# Patient Record
Sex: FEMALE | Race: BLACK OR AFRICAN AMERICAN | Employment: UNEMPLOYED | URBAN - METROPOLITAN AREA
[De-identification: names, ages, dates, MRNs, and addresses within clinical notes are randomized per-mention and may not be internally consistent; named-entity substitution may affect disease eponyms.]

---

## 2017-01-05 ENCOUNTER — APPOINTMENT (OUTPATIENT)
Dept: GENERAL RADIOLOGY | Age: 62
End: 2017-01-05
Attending: EMERGENCY MEDICINE
Payer: MEDICARE

## 2017-01-05 ENCOUNTER — HOSPITAL ENCOUNTER (EMERGENCY)
Age: 62
Discharge: HOME | End: 2017-01-05
Attending: EMERGENCY MEDICINE
Payer: MEDICARE

## 2017-01-05 VITALS
DIASTOLIC BLOOD PRESSURE: 63 MMHG | HEIGHT: 62 IN | OXYGEN SATURATION: 96 % | RESPIRATION RATE: 16 BRPM | SYSTOLIC BLOOD PRESSURE: 133 MMHG | TEMPERATURE: 98.1 F | WEIGHT: 120 LBS | HEART RATE: 54 BPM | BODY MASS INDEX: 22.08 KG/M2
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
## 2017-01-05 DIAGNOSIS — S70.01XA CONTUSION OF RIGHT HIP, INITIAL ENCOUNTER: Primary | ICD-10-CM

## 2017-01-05 DIAGNOSIS — R73.9 HYPERGLYCEMIA: ICD-10-CM

## 2017-01-05 LAB
ALBUMIN SERPL BCP-MCNC: 3.3 G/DL (ref 3.2–4.6)
ALBUMIN/GLOB SERPL: 0.8 {RATIO} (ref 1.2–3.5)
ALP SERPL-CCNC: 298 U/L (ref 50–136)
ALT SERPL-CCNC: 10 U/L (ref 12–65)
ANION GAP BLD CALC-SCNC: 11 MMOL/L (ref 7–16)
AST SERPL W P-5'-P-CCNC: 13 U/L (ref 15–37)
BASOPHILS # BLD AUTO: 0 K/UL (ref 0–0.2)
BASOPHILS # BLD: 0 % (ref 0–2)
BILIRUB SERPL-MCNC: 0.9 MG/DL (ref 0.2–1.1)
BNP SERPL-MCNC: 3077 PG/ML
BUN SERPL-MCNC: 55 MG/DL (ref 8–23)
CALCIUM SERPL-MCNC: 8.7 MG/DL (ref 8.3–10.4)
CHLORIDE SERPL-SCNC: 93 MMOL/L (ref 98–107)
CO2 SERPL-SCNC: 29 MMOL/L (ref 21–32)
CREAT SERPL-MCNC: 8.82 MG/DL (ref 0.6–1)
DIFFERENTIAL METHOD BLD: ABNORMAL
EOSINOPHIL # BLD: 0 K/UL (ref 0–0.8)
EOSINOPHIL NFR BLD: 1 % (ref 0.5–7.8)
ERYTHROCYTE [DISTWIDTH] IN BLOOD BY AUTOMATED COUNT: 14.4 % (ref 11.9–14.6)
GLOBULIN SER CALC-MCNC: 3.9 G/DL (ref 2.3–3.5)
GLUCOSE BLD STRIP.AUTO-MCNC: 239 MG/DL (ref 65–100)
GLUCOSE BLD STRIP.AUTO-MCNC: 382 MG/DL (ref 65–100)
GLUCOSE BLD STRIP.AUTO-MCNC: 414 MG/DL (ref 65–100)
GLUCOSE BLD STRIP.AUTO-MCNC: 442 MG/DL (ref 65–100)
GLUCOSE SERPL-MCNC: 490 MG/DL (ref 65–100)
HCT VFR BLD AUTO: 36.3 % (ref 35.8–46.3)
HGB BLD-MCNC: 11.5 G/DL (ref 11.7–15.4)
IMM GRANULOCYTES # BLD: 0 K/UL (ref 0–0.5)
IMM GRANULOCYTES NFR BLD AUTO: 0.2 % (ref 0–5)
LYMPHOCYTES # BLD AUTO: 24 % (ref 13–44)
LYMPHOCYTES # BLD: 1.2 K/UL (ref 0.5–4.6)
MCH RBC QN AUTO: 30.5 PG (ref 26.1–32.9)
MCHC RBC AUTO-ENTMCNC: 31.7 G/DL (ref 31.4–35)
MCV RBC AUTO: 96.3 FL (ref 79.6–97.8)
MONOCYTES # BLD: 0.3 K/UL (ref 0.1–1.3)
MONOCYTES NFR BLD AUTO: 6 % (ref 4–12)
NEUTS SEG # BLD: 3.4 K/UL (ref 1.7–8.2)
NEUTS SEG NFR BLD AUTO: 69 % (ref 43–78)
PLATELET # BLD AUTO: 162 K/UL (ref 150–450)
PMV BLD AUTO: 12.5 FL (ref 10.8–14.1)
POTASSIUM SERPL-SCNC: 4.7 MMOL/L (ref 3.5–5.1)
PROT SERPL-MCNC: 7.2 G/DL (ref 6.3–8.2)
RBC # BLD AUTO: 3.77 M/UL (ref 4.05–5.25)
SODIUM SERPL-SCNC: 133 MMOL/L (ref 136–145)
WBC # BLD AUTO: 5 K/UL (ref 4.3–11.1)

## 2017-01-05 PROCEDURE — 82962 GLUCOSE BLOOD TEST: CPT

## 2017-01-05 PROCEDURE — 74011250636 HC RX REV CODE- 250/636: Performed by: EMERGENCY MEDICINE

## 2017-01-05 PROCEDURE — 99285 EMERGENCY DEPT VISIT HI MDM: CPT | Performed by: EMERGENCY MEDICINE

## 2017-01-05 PROCEDURE — 83880 ASSAY OF NATRIURETIC PEPTIDE: CPT

## 2017-01-05 PROCEDURE — 96374 THER/PROPH/DIAG INJ IV PUSH: CPT | Performed by: EMERGENCY MEDICINE

## 2017-01-05 PROCEDURE — 96376 TX/PRO/DX INJ SAME DRUG ADON: CPT | Performed by: EMERGENCY MEDICINE

## 2017-01-05 PROCEDURE — 96375 TX/PRO/DX INJ NEW DRUG ADDON: CPT | Performed by: EMERGENCY MEDICINE

## 2017-01-05 PROCEDURE — 85025 COMPLETE CBC W/AUTO DIFF WBC: CPT | Performed by: EMERGENCY MEDICINE

## 2017-01-05 PROCEDURE — 80053 COMPREHEN METABOLIC PANEL: CPT | Performed by: EMERGENCY MEDICINE

## 2017-01-05 PROCEDURE — 71010 XR CHEST SNGL V: CPT

## 2017-01-05 PROCEDURE — 74011250637 HC RX REV CODE- 250/637: Performed by: EMERGENCY MEDICINE

## 2017-01-05 PROCEDURE — 73502 X-RAY EXAM HIP UNI 2-3 VIEWS: CPT

## 2017-01-05 PROCEDURE — 74011636637 HC RX REV CODE- 636/637: Performed by: EMERGENCY MEDICINE

## 2017-01-05 RX ORDER — HYDROCODONE BITARTRATE AND ACETAMINOPHEN 7.5; 325 MG/1; MG/1
1 TABLET ORAL
Status: COMPLETED | OUTPATIENT
Start: 2017-01-05 | End: 2017-01-05

## 2017-01-05 RX ORDER — LORAZEPAM 2 MG/ML
0.5 INJECTION INTRAMUSCULAR
Status: COMPLETED | OUTPATIENT
Start: 2017-01-05 | End: 2017-01-05

## 2017-01-05 RX ORDER — HYDROCODONE BITARTRATE AND ACETAMINOPHEN 7.5; 325 MG/1; MG/1
1 TABLET ORAL
Qty: 8 TAB | Refills: 0 | Status: SHIPPED | OUTPATIENT
Start: 2017-01-05 | End: 2017-02-27

## 2017-01-05 RX ADMIN — LORAZEPAM 0.5 MG: 2 INJECTION INTRAMUSCULAR; INTRAVENOUS at 18:43

## 2017-01-05 RX ADMIN — INSULIN HUMAN 10 UNITS: 100 INJECTION, SOLUTION PARENTERAL at 14:28

## 2017-01-05 RX ADMIN — INSULIN HUMAN 10 UNITS: 100 INJECTION, SOLUTION PARENTERAL at 17:50

## 2017-01-05 RX ADMIN — HYDROCODONE BITARTRATE AND ACETAMINOPHEN 1 TABLET: 7.5; 325 TABLET ORAL at 14:28

## 2017-01-05 NOTE — ED NOTES
RN at bedside. Pt noted to be wearing oxygen around forehead. Pulsox monitoring initiated. Pt sats 96-97%.

## 2017-01-05 NOTE — ED PROVIDER NOTES
HPI Comments: 53-year-old female hasn't some dependent diabetes and has chronic renal failure and is on hemodialysis 3 times a week. states she tripped earlier today and fell landed and hurt her back and her hip. Also says her blood sugars of been running high in the last day as well. The chest pain abdominal pain and vomiting and fever and headache no focal numbness or weakness. No change in insulin dose recently    Patient is a 59 y.o. female presenting with fall and hyperglycemia. The history is provided by the patient. Fall   The accident occurred 1 to 2 hours ago. The fall occurred while standing. She fell from a height of ground level. She landed on hard floor. The point of impact was the right hip. The pain is moderate. She was not ambulatory at the scene. Pertinent negatives include no fever, no numbness, no abdominal pain, no nausea, no vomiting, no headaches, no extremity weakness, no loss of consciousness, no tingling and no laceration. The risk factors include being elderly. The symptoms are aggravated by activity. High Blood Sugar    Pertinent negatives include no fever, no diarrhea, no nausea, no vomiting, no dysuria, no headaches, no chest pain and no back pain.         Past Medical History:   Diagnosis Date    A-V fistula ([facility] 12/10/2015    Anemia 9/14/2010    Asthma     CAD (coronary artery disease)     Chronic anemia     Chronic diastolic congestive heart failure ([facility] [facility]      02/2015:  EF 65%     Chronic kidney disease     Chronic pain of right knee     Dermatophytosis of nail 12/16/2016    Dyslipidemia     Edema of foot 12/16/2016    End stage renal disease on dialysis [facility])     ESRD on dialysis ([facility] [facility] 4/1/2015    GERD (gastroesophageal reflux disease)     H/O cardiac catheterization 2013    H/O enucleation of left eyeball     History of DVT of lower extremity 2015     left    History of GI bleed 2013    History of PTCA 2009    HTN (hypertension), malignant 9/14/2010    Hyperlipidemia 2016    IBS (irritable bowel syndrome) 2015    Kidney disease 2016    Left renal mass     Medical non-compliance 2016    Multinodular thyroid     Osteoarthritis 2015    Poor historian     Postsurgical percutaneous transluminal coronary angioplasty (PTCA) status 2015    Prurigo nodularis     S/P CABG (coronary artery bypass graft) 2010    S/P CABG x 1     S/P total knee arthroplasty      Right    SOB (shortness of breath) 2015    Thyroid nodule 2014    Type 2 diabetes mellitus without complication ([facility] [address]        Past Surgical History:   Procedure Laterality Date    Hx total abdominal hysterectomy      Hx colonoscopy      Hx heart catheterization       cardiac cath 3-21-13, CABG [facility] -[facility]-vessels too small to put stents in    Hx vascular access  13    Hx ptca      Vascular surgery procedure unlist Left 2013     Left Arm AV graft    Hx heent Left 2012     Left Eye Enucleation    Hx coronary artery bypass graft  2008     x 1 vessel    Hx hysterectomy      Hx endoscopy      Hx mohs procedure Right     Hx knee arthroscopy Right 2015    Hx orthopaedic       L knee biopsy    Hx  section       1    Hx heart valve surgery           Family History:   Problem Relation Age of Onset    Heart Disease Mother     Arrhythmia Mother     Heart Disease Father     Heart Attack Father 48     MI    Diabetes Sister     Hypertension Brother     Diabetes Brother     Diabetes Sister     Diabetes Sister     Diabetes Son     Cancer Other      Family Hx of Cancer       Social History     Social History    Marital status: SINGLE     Spouse name: N/A    Number of children: 1    Years of education: N/A     Occupational History    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 Respiratory: Negative for cough and shortness of breath. Cardiovascular: Negative for chest pain and palpitations. Gastrointestinal: Negative for abdominal pain, diarrhea, nausea and vomiting. Genitourinary: Negative for dysuria and flank pain. Musculoskeletal: Negative for back pain, extremity weakness and neck pain. Skin: Negative for color change and rash. Neurological: Negative for tingling, loss of consciousness, syncope, numbness and headaches. All other systems reviewed and are negative. Vitals:    01/05/17 1229 01/05/17 1403   BP: 132/50 161/66   Pulse: (!) 54 (!) 54   Resp: 18 16   Temp: 98.1 °F (36.7 °C)    SpO2: 90% 97%   Weight: 54.4 kg (120 lb)    Height: 5' 2\" (1.575 m)             Physical Exam   Constitutional: She is oriented to person, place, and time. She appears well-developed and well-nourished. No distress. HENT:   Head: Normocephalic and atraumatic. Mouth/Throat: Oropharynx is clear and moist. No oropharyngeal exudate. Eyes: Conjunctivae and EOM are normal. Pupils are equal, round, and reactive to light. Neck: Normal range of motion. Neck supple. Cardiovascular: Normal rate, regular rhythm and intact distal pulses. No murmur heard. Pulmonary/Chest: Breath sounds normal. No respiratory distress. Abdominal: Soft. Bowel sounds are normal. She exhibits no mass. There is no tenderness. There is no rebound and no guarding. No hernia. Musculoskeletal:        Right hip: She exhibits decreased range of motion, tenderness and bony tenderness. Right knee: Tenderness found. Lumbar back: She exhibits normal range of motion, no tenderness and no bony tenderness. Neurological: She is alert and oriented to person, place, and time. Gait normal.   Nl speech   Skin: Skin is warm and dry. No laceration noted. Psychiatric: She has a normal mood and affect. Her speech is normal.   Nursing note and vitals reviewed. MDM  Number of Diagnoses or Management Options  Diagnosis management comments: Assessment left hip fracture because of fall. No axial back pain to create suspicion for compression fractures of the spine. Blood sugar is elevated. Assessment blood work for dehydration or infection. Amount and/or Complexity of Data Reviewed  Clinical lab tests: reviewed and ordered  Tests in the radiology section of CPT®: ordered and reviewed  Review and summarize past medical records: (Recent visit for abdominal pain.)    Patient Progress  Patient progress: stable    ED Course       Procedures      Review of records reveals seen here he few days ago for abdominal pain with vomiting along few weeks. Had negative CT at that time. Results Include:    Recent Results (from the past 24 hour(s))   GLUCOSE, POC    Collection Time: 01/05/17 12:40 PM   Result Value Ref Range    Glucose (POC) 442 (H) 65 - 100 mg/dL   CBC WITH AUTOMATED DIFF    Collection Time: 01/05/17 12:43 PM   Result Value Ref Range    WBC 5.0 4.3 - 11.1 K/uL    RBC 3.77 (L) 4.05 - 5.25 M/uL    HGB 11.5 (L) 11.7 - 15.4 g/dL    HCT 36.3 35.8 - 46.3 %    MCV 96.3 79.6 - 97.8 FL    MCH 30.5 26.1 - 32.9 PG    MCHC 31.7 31.4 - 35.0 g/dL    RDW 14.4 11.9 - 14.6 %    PLATELET 908 292 - 124 K/uL    MPV 12.5 10.8 - 14.1 FL    DF AUTOMATED      NEUTROPHILS 69 43 - 78 %    LYMPHOCYTES 24 13 - 44 %    MONOCYTES 6 4.0 - 12.0 %    EOSINOPHILS 1 0.5 - 7.8 %    BASOPHILS 0 0.0 - 2.0 %    IMMATURE GRANULOCYTES 0.2 0.0 - 5.0 %    ABS. NEUTROPHILS 3.4 1.7 - 8.2 K/UL    ABS. LYMPHOCYTES 1.2 0.5 - 4.6 K/UL    ABS. MONOCYTES 0.3 0.1 - 1.3 K/UL    ABS. EOSINOPHILS 0.0 0.0 - 0.8 K/UL    ABS. BASOPHILS 0.0 0.0 - 0.2 K/UL    ABS. IMM.  GRANS. 0.0 0.0 - 0.5 K/UL   METABOLIC PANEL, COMPREHENSIVE Collection Time: 01/05/17 12:43 PM   Result Value Ref Range    Sodium 133 (L) 136 - 145 mmol/L    Potassium 4.7 3.5 - 5.1 mmol/L    Chloride 93 (L) 98 - 107 mmol/L    CO2 29 21 - 32 mmol/L    Anion gap 11 7 - 16 mmol/L    Glucose 490 (HH) 65 - 100 mg/dL    BUN 55 (H) 8 - 23 MG/DL    Creatinine 8.82 (H) 0.6 - 1.0 MG/DL    GFR est AA 6 (L) >60 ml/min/1.73m2    GFR est non-AA 5 (L) >60 ml/min/1.73m2    Calcium 8.7 8.3 - 10.4 MG/DL    Bilirubin, total 0.9 0.2 - 1.1 MG/DL    ALT 10 (L) 12 - 65 U/L    AST 13 (L) 15 - 37 U/L    Alk. phosphatase 298 (H) 50 - 136 U/L    Protein, total 7.2 6.3 - 8.2 g/dL    Albumin 3.3 3.2 - 4.6 g/dL    Globulin 3.9 (H) 2.3 - 3.5 g/dL    A-G Ratio 0.8 (L) 1.2 - 3.5     BNP    Collection Time: 01/05/17 12:43 PM   Result Value Ref Range    BNP 3077 pg/mL         Xr Chest Sngl V    Result Date: 1/5/2017  AP chest radiograph History: cough, fall, 61 years Female chest pain after fall Comparison: Chest radiograph August 22, 2016 Findings:   Persistent moderate cardiomegaly with evidence of CABG. Persistent low lung volumes. Mild diffuse interstitial prominence unchanged, nonspecific. Mild subsegmental atelectasis bilateral lung bases. No evidence of pneumothorax, pleural effusion, or new air space consolidation. Visualized soft tissue and osseous structures otherwise unremarkable. Impression:  No significant interval change. Xr Hip Rt W Or Wo Pelv 2-3 Vws    Result Date: 1/5/2017  Exam:  AP pelvis and right hip radiographs History:  pain, pain/injury in hip, 61 years Female acute right hip pain after fall yesterday Comparison: None available Findings:  No evidence of acute fracture or dislocation. There are mild degenerative changes of the bilateral hip joints with marginal osteophytosis and subchondral sclerosis. Normal alignment. Mild diffuse osteopenia. Visualized soft tissues otherwise unremarkable. Impression:  No evidence of acute injury. Mild osteoarthritis. Patient has some muscle spasms in the left leg. .  Seems to better with compresses and half milligram of  Ativan IV.   This point she sitting up and comfortable, stands and bears the weight

## 2017-01-06 NOTE — DISCHARGE INSTRUCTIONS
Rest with heating pad or hot water bottle to the sore area. Recheck with your doctor next week if not improving. Preventing Falls: Care Instructions  Your Care Instructions  Getting around your home safely can be a challenge if you have injuries or health problems that make it easy for you to fall. Loose rugs and furniture in walkways are among the dangers for many older people who have problems walking or who have poor eyesight. People who have conditions such as arthritis, osteoporosis, or dementia also have to be careful not to fall. You can make your home safer with a few simple measures. Follow-up care is a key part of your treatment and safety. Be sure to make and go to all appointments, and call your doctor if you are having problems. It's also a good idea to know your test results and keep a list of the medicines you take. How can you care for yourself at home? Taking care of yourself  · You may get dizzy if you do not drink enough water. To prevent dehydration, drink plenty of fluids, enough so that your urine is light yellow or clear like water. Choose water and other caffeine-free clear liquids. If you have kidney, heart, or liver disease and have to limit fluids, talk with your doctor before you increase the amount of fluids you drink. · Exercise regularly to improve your strength, muscle tone, and balance. Walk if you can. Swimming may be a good choice if you cannot walk easily. · Have your vision and hearing checked each year or any time you notice a change. If you have trouble seeing and hearing, you might not be able to avoid objects and could lose your balance. · Know the side effects of the medicines you take. Ask your doctor or pharmacist whether the medicines you take can affect your balance. Sleeping pills or sedatives can affect your balance. · Limit the amount of alcohol you drink. Alcohol can impair your balance and other senses.   · Ask your doctor whether calluses or corns on your feet need to be removed. If you wear loose-fitting shoes because of calluses or corns, you can lose your balance and fall. · Talk to your doctor if you have numbness in your feet. Preventing falls at home  · Remove raised doorway thresholds, throw rugs, and clutter. Repair loose carpet or raised areas in the floor. · Move furniture and electrical cords to keep them out of walking paths. · Use nonskid floor wax, and wipe up spills right away, especially on ceramic tile floors. · If you use a walker or cane, put rubber tips on it. If you use crutches, clean the bottoms of them regularly with an abrasive pad, such as steel wool. · Keep your house well lit, especially [place], and outside walkways. Use night-lights in areas such as hallways and bathrooms. Add extra light switches or use remote switches (such as switches that go on or off when you clap your hands) to make it easier to turn lights on if you have to get up during the night. · Install sturdy handrails on stairways. · Move items in your cabinets so that the things you use a lot are on the lower shelves (about waist level). · Keep a cordless phone and a flashlight with new batteries by your bed. If possible, put a phone in each of the main rooms of your house, or carry a cell phone in case you fall and cannot reach a phone. Or, you can wear a device around your neck or wrist. You push a button that sends a signal for help. · Wear low-heeled shoes that fit well and give your feet good support. Use footwear with nonskid soles. Check the heels and soles of your shoes for wear. Repair or replace worn heels or soles. · Do not wear socks without shoes on wood floors. · Walk on the grass when the sidewalks are slippery. If you live in an area that gets snow and ice in the winter, sprinkle salt on slippery steps and sidewalks.   Preventing falls in the bath  · Install grab bars and nonskid mats inside and outside your shower or tub and near the toilet and sinks. · Use shower chairs and bath benches. · Use a hand-held shower head that will allow you to sit while showering. · Get into a tub or shower by putting the weaker leg in first. Get out of a tub or shower with your strong side first.  · Repair loose toilet seats and consider installing a raised toilet seat to make getting on and off the toilet easier. · Keep your bathroom door unlocked while you are in the shower. Where can you learn more? Go to http://daynaVets USAijeoma.info/. Enter [id number] [id number] in the search box to learn more about \"Preventing Falls: Care Instructions. \"  Current as of: August 4, 2016  Content Version: 11.1  © 3817-3305 [employer]. Care instructions adapted under license by [employer] (which disclaims liability or warranty for this information). If you have questions about a medical condition or this instruction, always ask your healthcare professional. [place] [address] any warranty or liability for your use of this information. Hip Pain: Care Instructions  Your Care Instructions  Hip pain may be caused by many things, including overuse, a fall, or a twisting movement. Another cause of hip pain is arthritis. Your pain may increase when you stand up, walk, or squat. The pain may come and go or may be constant. Home treatment can help relieve hip pain, swelling, and stiffness. If your pain is ongoing, you may need more tests and treatment. Follow-up care is a key part of your treatment and safety. Be sure to make and go to all appointments, and call your doctor if you are having problems. Its also a good idea to know your test results and keep a list of the medicines you take. How can you care for yourself at home? · Take pain medicines exactly as directed. ¨ If the doctor gave you a prescription medicine for pain, take it as prescribed.   ¨ If you are not taking a prescription pain medicine, ask your doctor if you can take an over-the-counter medicine. · Rest and protect your hip. Take a break from any activity, including standing or walking, that may cause pain. · Put ice or a cold pack against your hip for 10 to 20 minutes at a time. Try to do this every 1 to 2 hours for the next 3 days (when you are awake) or until the swelling goes down. Put a thin cloth between the ice and your skin. · Sleep on your healthy side with a pillow between your knees, or sleep on your back with pillows under your knees. · If there is no swelling, you can put moist heat, a heating pad, or a warm cloth on your hip. Do gentle stretching exercises to help keep your hip flexible. · Learn how to prevent falls. Have your vision and hearing checked regularly. Wear slippers or shoes with a nonskid sole. · Stay at a healthy weight. · Wear comfortable shoes. When should you call for help? Call 911 anytime you think you may need emergency care. For example, call if:  · You have sudden chest pain and shortness of breath, or you cough up blood. · You are not able to stand or walk or bear weight. · Your buttocks, legs, or feet feel numb or tingly. · Your leg or foot is cool or pale or changes color. · You have severe pain. Call your doctor now or seek immediate medical care if:  · You have signs of infection, such as:  ¨ Increased pain, swelling, warmth, or redness in the hip area. ¨ Red streaks leading from the hip area. ¨ Pus draining from the hip area. ¨ A fever. · You have signs of a blood clot, such as:  ¨ Pain in your calf, back of the knee, thigh, or groin. ¨ Redness and swelling in your leg or groin. · You are not able to bend, straighten, or move your leg normally. · You have trouble urinating or having bowel movements. Watch closely for changes in your health, and be sure to contact your doctor if:  · You do not get better as expected. Where can you learn more? Go to http://charles.info/.   Enter Z720 in the search box to learn more about \"Hip Pain: Care Instructions. \"  Current as of: May 27, 2016  Content Version: 11.1  © [id number] [employer]. Care instructions adapted under license by [employer] (which disclaims liability or warranty for this information). If you have questions about a medical condition or this instruction, always ask your healthcare professional. [place] [address] any warranty or liability for your use of this information.

## 2017-01-06 NOTE — ED NOTES
Discharge instructions reviewed with patient. Patient verbalizes understanding. IV removed tip  Intact. RN contacted Pt's sister Andreia Rodriges on the phone. Patient taken to waiting room to wait for sister. Paperwork in hand.

## 2017-01-06 NOTE — ED NOTES
Report given to Mello Pereira RN and care transferred at this time. Pt in no apparent distress. Resting quietly on stretcher after Ativan admin. Continuous pulsox monitoring ongoing.

## 2017-01-09 ENCOUNTER — HOSPITAL ENCOUNTER (EMERGENCY)
Age: 62
Discharge: HOME | End: 2017-01-09
Attending: EMERGENCY MEDICINE
Payer: MEDICARE

## 2017-01-09 ENCOUNTER — APPOINTMENT (OUTPATIENT)
Dept: CT IMAGING | Age: 62
End: 2017-01-09
Attending: EMERGENCY MEDICINE
Payer: MEDICARE

## 2017-01-09 VITALS
BODY MASS INDEX: 22.08 KG/M2 | HEART RATE: 70 BPM | TEMPERATURE: 97.4 F | WEIGHT: 120 LBS | OXYGEN SATURATION: 96 % | RESPIRATION RATE: 16 BRPM | DIASTOLIC BLOOD PRESSURE: 94 MMHG | SYSTOLIC BLOOD PRESSURE: 119 MMHG | HEIGHT: 62 IN

## 2017-01-09 DIAGNOSIS — R29.6 FREQUENT FALLS: Primary | ICD-10-CM

## 2017-01-09 DIAGNOSIS — M25.511 RIGHT SHOULDER PAIN, UNSPECIFIED CHRONICITY: ICD-10-CM

## 2017-01-09 DIAGNOSIS — M25.561 RIGHT KNEE PAIN, UNSPECIFIED CHRONICITY: ICD-10-CM

## 2017-01-09 LAB
ALBUMIN SERPL BCP-MCNC: 3.5 G/DL (ref 3.2–4.6)
ALBUMIN/GLOB SERPL: 0.8 {RATIO} (ref 1.2–3.5)
ALP SERPL-CCNC: 415 U/L (ref 50–136)
ALT SERPL-CCNC: 14 U/L (ref 12–65)
ANION GAP BLD CALC-SCNC: 11 MMOL/L (ref 7–16)
AST SERPL W P-5'-P-CCNC: 21 U/L (ref 15–37)
BASOPHILS # BLD AUTO: 0 K/UL (ref 0–0.2)
BASOPHILS # BLD: 0 % (ref 0–2)
BILIRUB SERPL-MCNC: 0.8 MG/DL (ref 0.2–1.1)
BUN SERPL-MCNC: 72 MG/DL (ref 8–23)
CALCIUM SERPL-MCNC: 8.8 MG/DL (ref 8.3–10.4)
CHLORIDE SERPL-SCNC: 96 MMOL/L (ref 98–107)
CO2 SERPL-SCNC: 25 MMOL/L (ref 21–32)
CREAT SERPL-MCNC: 10 MG/DL (ref 0.6–1)
DIFFERENTIAL METHOD BLD: ABNORMAL
EOSINOPHIL # BLD: 0 K/UL (ref 0–0.8)
EOSINOPHIL NFR BLD: 1 % (ref 0.5–7.8)
ERYTHROCYTE [DISTWIDTH] IN BLOOD BY AUTOMATED COUNT: 15.2 % (ref 11.9–14.6)
GLOBULIN SER CALC-MCNC: 4.6 G/DL (ref 2.3–3.5)
GLUCOSE SERPL-MCNC: 662 MG/DL (ref 65–100)
HCT VFR BLD AUTO: 43.5 % (ref 35.8–46.3)
HGB BLD-MCNC: 13.3 G/DL (ref 11.7–15.4)
IMM GRANULOCYTES # BLD: 0 K/UL (ref 0–0.5)
IMM GRANULOCYTES NFR BLD AUTO: 0.6 % (ref 0–5)
INR PPP: 1.2 (ref 0.9–1.2)
LYMPHOCYTES # BLD AUTO: 15 % (ref 13–44)
LYMPHOCYTES # BLD: 0.7 K/UL (ref 0.5–4.6)
MCH RBC QN AUTO: 30.7 PG (ref 26.1–32.9)
MCHC RBC AUTO-ENTMCNC: 30.6 G/DL (ref 31.4–35)
MCV RBC AUTO: 100.5 FL (ref 79.6–97.8)
MONOCYTES # BLD: 0.4 K/UL (ref 0.1–1.3)
MONOCYTES NFR BLD AUTO: 9 % (ref 4–12)
NEUTS SEG # BLD: 3.6 K/UL (ref 1.7–8.2)
NEUTS SEG NFR BLD AUTO: 74 % (ref 43–78)
PLATELET # BLD AUTO: 150 K/UL (ref 150–450)
PMV BLD AUTO: 12.3 FL (ref 10.8–14.1)
POTASSIUM SERPL-SCNC: 5.2 MMOL/L (ref 3.5–5.1)
PROT SERPL-MCNC: 8.1 G/DL (ref 6.3–8.2)
PROTHROMBIN TIME: 13.2 SEC (ref 9.6–12)
RBC # BLD AUTO: 4.33 M/UL (ref 4.05–5.25)
SODIUM SERPL-SCNC: 132 MMOL/L (ref 136–145)
WBC # BLD AUTO: 4.8 K/UL (ref 4.3–11.1)

## 2017-01-09 PROCEDURE — 74011250637 HC RX REV CODE- 250/637: Performed by: EMERGENCY MEDICINE

## 2017-01-09 PROCEDURE — 80053 COMPREHEN METABOLIC PANEL: CPT | Performed by: EMERGENCY MEDICINE

## 2017-01-09 PROCEDURE — 85025 COMPLETE CBC W/AUTO DIFF WBC: CPT | Performed by: EMERGENCY MEDICINE

## 2017-01-09 PROCEDURE — 85610 PROTHROMBIN TIME: CPT | Performed by: EMERGENCY MEDICINE

## 2017-01-09 PROCEDURE — 70450 CT HEAD/BRAIN W/O DYE: CPT

## 2017-01-09 PROCEDURE — 99283 EMERGENCY DEPT VISIT LOW MDM: CPT | Performed by: EMERGENCY MEDICINE

## 2017-01-09 RX ORDER — HYDROCODONE BITARTRATE AND ACETAMINOPHEN 10; 325 MG/1; MG/1
1 TABLET ORAL
Status: COMPLETED | OUTPATIENT
Start: 2017-01-09 | End: 2017-01-09

## 2017-01-09 RX ORDER — HYDROCODONE BITARTRATE AND ACETAMINOPHEN 5; 325 MG/1; MG/1
1-2 TABLET ORAL
Qty: 20 TAB | Refills: 0 | Status: SHIPPED | OUTPATIENT
Start: 2017-01-09 | End: 2017-02-27

## 2017-01-09 RX ADMIN — HYDROCODONE BITARTRATE AND ACETAMINOPHEN 1 TABLET: 10; 325 TABLET ORAL at 22:22

## 2017-01-09 NOTE — ED TRIAGE NOTES
Patient reports multiple falls over the past week. Denies LOC, dizziness, or weakness. States she does not know why she is falling. Patient states she just started using a walker after falls began.

## 2017-01-10 NOTE — H&P
HOSPITALIST Consult  NAME:  Colleen Merino   Age:  59 y.o.  :   1955   MRN:   [id number]  PCP: Alex Dominguez MD  Treatment Team: Attending Provider: Graig Dance, MD; Primary Nurse: Jennifer Sepulveda RN    Full code  HPI:   Ms. Toño Aguirre is a 60 yo female who presented with c/o right knee pain, falls due to right knee giving out. Pt lives at home with son. She reports being seen by Orthopedics outpt multiple times (last time was about a week ago) for this same complaint. Has had knee replacement on right side. Pt describes in detail that she walks and her right knee \"gives out sometimes\" and her right knee \"always hurts\". She denies syncope, dizziness, generalized weakness, n/v/d, fever. CT head neg for acute changes. CXR without acute findings. Right hip xray without acute findings. At bedside with nurse trying to walk pt, pt makes minimal effort, shuffling gait. Results summary of Diagnostic Studies/Procedures copied from within [facility] EMR:   AP chest radiograph     History: cough, fall, 61 years Female chest pain after fall     Comparison: Chest radiograph 2016     Findings: Persistent moderate cardiomegaly with evidence of CABG. Persistent  low lung volumes. Mild diffuse interstitial prominence unchanged, nonspecific. Mild subsegmental atelectasis bilateral lung bases. No evidence of  pneumothorax, pleural effusion, or new air space consolidation. Visualized soft  tissue and osseous structures otherwise unremarkable.      IMPRESSION  Impression: No significant interval change.         Exam: AP pelvis and right hip radiographs     History: pain, pain/injury in hip, 61 years Female acute right hip pain after  fall yesterday     Comparison: None available     Findings: No evidence of acute fracture or dislocation. There are mild  degenerative changes of the bilateral hip joints with marginal osteophytosis and  subchondral sclerosis. Normal alignment. Mild diffuse osteopenia. Visualized  soft tissues otherwise unremarkable.     IMPRESSION  Impression: No evidence of acute injury. Mild osteoarthritis. CT HEAD WITHOUT CONTRAST.     INDICATION: Right-sided weakness and frequent falls.     COMPARISON: 2220 15.      TECHNIQUE: 5 mm axial scans from the skull base to the vertex. Our CT  scanners use one or more of the following: Automated exposure control,  adjustment of the mA and or kV according to patient size, iterative  reconstruction.     FINDINGS: No acute intraparenchymal hemorrhage or abnormal extra-axial fluid  collection. The ventricles are normal size. Moderate white matter low  attenuation is present, nonspecific, likely chronic small vessel disease. No  midline shift or mass effect. Included portion of the paranasal sinuses and  orbits grossly unremarkable.     IMPRESSION  IMPRESSION: Negative for acute intracranial abnormality. Chronic changes.     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 fistula ([facility] [address] 12/10/2015    Anemia 9/14/2010    Asthma     CAD (coronary artery disease)     Chronic anemia     Chronic diastolic congestive heart failure ([facility] [address]      02/2015:  EF 65%     Chronic kidney disease     Chronic pain of right knee     Dermatophytosis of nail 12/16/2016    Dyslipidemia     Edema of foot 12/16/2016    End stage renal disease on dialysis [facility])     ESRD on dialysis ([facility] [address] 4/1/2015    GERD (gastroesophageal reflux disease)     H/O cardiac catheterization 2013    H/O enucleation of left eyeball     History of DVT of lower extremity 2015     left    History of GI bleed 2013    History of PTCA 2009    HTN (hypertension), malignant 9/14/2010    Hyperlipidemia 12/16/2016    IBS (irritable bowel syndrome) 4/1/2015    Kidney disease 12/16/2016    Left renal mass     Medical non-compliance 6/1/2016    Multinodular thyroid     Osteoarthritis 5/12/2015    Poor historian     Postsurgical percutaneous transluminal coronary angioplasty (PTCA) status 2015    Prurigo nodularis     S/P CABG (coronary artery bypass graft) 2010    S/P CABG x 1     S/P total knee arthroplasty      Right    SOB (shortness of breath) 2015    Thyroid nodule 2014    Type 2 diabetes mellitus without complication ([facility] [address] 3/84/2222      Past Surgical History   Procedure Laterality Date    Hx total abdominal hysterectomy      Hx colonoscopy      Hx heart catheterization       cardiac cath 3-21-13, CABG x -in @ S-vessels too small to put stents in    Hx vascular access  13    Hx ptca      Vascular surgery procedure unlist Left 2013     Left Arm AV graft    Hx heent Left 2012     Left Eye Enucleation    Hx coronary artery bypass graft  2008     x 1 vessel    Hx hysterectomy      Hx endoscopy      Hx mohs procedure Right     Hx knee arthroscopy Right 2015    Hx orthopaedic       L knee biopsy    Hx  section       1    Hx heart valve surgery        Prior to Admission Medications   Prescriptions Last Dose Informant Patient Reported? Taking? ALPRAZolam (XANAX) 1 mg tablet   Yes No   Sig: Take 1 Tab by mouth daily. HYDROcodone-acetaminophen (NORCO) 7.5-325 mg per tablet   No No   Sig: Take 1 Tab by mouth every six (6) hours as needed for Pain. Max Daily Amount: 4 Tabs. POTASSIUM CHLORIDE   Yes No   Sig: Potassium Chloride CR (10MEQ Capsule ER, 1 Oral every day)   VIT B CMPLX 3/FA/VIT C/BIOTIN (VOL-CARE RX PO)   Yes No   Si mg. Tablet, Oral   albuterol (PROAIR HFA) 90 mcg/actuation inhaler   No No   Sig: Take 2 Puffs by inhalation every four (4) hours as needed for Wheezing. amLODIPine (NORVASC) 10 mg tablet   Yes No   Sig: Take 10 mg by mouth daily. aspirin delayed-release 81 mg tablet   Yes No   Sig: Take 81 mg by mouth daily. Instructed to take DOS per anesthesia protocol with a sip of water.    carvedilol (COREG) 6.25 mg tablet Yes No   Sig: Take 6.25 mg by mouth two (2) times daily (with meals). cinacalcet (SENSIPAR) 30 mg tablet   Yes No   Sig: Take 30 mg by mouth daily. clindamycin (CLINDAGEL) 1 % topical gel   Yes No   Sig: Apply  to affected area daily. Apply to lesion on hand every day. clopidogrel (PLAVIX) 75 mg tab   Yes No   Sig: Take 75 mg by mouth daily. furosemide (LASIX) 80 mg tablet   Yes No   Sig: Take 80 mg by mouth two (2) times a day.   gabapentin (NEURONTIN) 100 mg capsule   Yes No   Sig: Take 100 mg by mouth daily. hydrOXYzine (ATARAX) 25 mg tablet   Yes No   Sig: Take 1 Tab by mouth every six (6) hours as needed. insulin NPH/insulin regular (NOVOLIN 70/30) 100 unit/mL (70-30) injection   Yes No   Si Units by SubCUTAneous route two (2) times a day. ipratropium-albuterol (COMBIVENT)  mcg/actuation inhaler   Yes No   Sig: Take  by inhalation as needed. isosorbide mononitrate ER (IMDUR) 60 mg CR tablet   Yes No   Sig: Take 60 mg by mouth daily. Instructed to take DOS per anesthesia protocol with a sip of water. lisinopril (PRINIVIL, ZESTRIL) 10 mg tablet   Yes No   Sig: Take 10 mg by mouth daily. nitroglycerin (NITRODUR) 0.4 mg/hr   Yes No   Si Patch by TransDERmal route daily as needed. pantoprazole (PROTONIX) 40 mg tablet   Yes No   Sig: Take 40 mg by mouth daily. pravastatin (PRAVACHOL) 80 mg tablet   Yes No   Sig: Take 80 mg by mouth nightly. predniSONE (STERAPRED DS) 10 mg dose pack   No No   Sig: TAKE AS DIRECTED   sevelamer carbonate (RENVELA) 800 mg tab tab   Yes No   Sig: Take  by mouth three (3) times daily. temazepam (RESTORIL) 15 mg capsule   Yes No   Sig: Take 1 Cap by mouth nightly. zolpidem (AMBIEN) 10 mg tablet   Yes No   Sig: Take 10 mg by mouth nightly.       Facility-Administered Medications: None     Allergies   Allergen Reactions    Pcn [Penicillins] Swelling      Social History   Substance Use Topics    Smoking status: Never Smoker    Smokeless tobacco: Never Used    Alcohol use No      Family History   Problem Relation Age of Onset    Heart Disease Mother     Arrhythmia Mother     Heart Disease Father     Heart Attack Father 48     MI    Diabetes Sister     Hypertension Brother     Diabetes Brother     Diabetes Sister     Diabetes Sister     Diabetes Son     Cancer Other      Family Hx of Cancer        Objective:     Visit Vitals    /74 (BP 1 Location: Left arm, BP Patient Position: Sitting)    Pulse 70    Temp 97.4 °F (36.3 °C)    Resp 16    Ht 5' 2\" (1.575 m)    Wt 54.4 kg (120 lb)    SpO2 96%    BMI 21.95 kg/m2      Temp (24hrs), Av.4 °F (36.3 °C), Min:97.4 °F (36.3 °C), Max:97.4 °F (36.3 °C)    Oxygen Therapy  O2 Sat (%): 96 % (17)  Pulse via Oximetry: 74 beats per minute (17 1501)  O2 Device: Room air (17)  Physical Exam:  General:    Alert, cooperative, no distress, appears stated age. Head:   Normocephalic, without obvious abnormality, atraumatic. Nose:  Nares normal. No drainage or sinus tenderness. Lungs:   CTA, resp even and nonlabored  Heart:  S1S2 present without murmurs rubs gallops. RRR. Trace bilateral LE edema  Abdomen:   Soft, non-tender. Not distended. Bowel sounds normal. No masses  Extremities: No cyanosis. Right knee pain on palpation. Limited ROM right knee due to pain. Skin:     Texture, turgor normal. No rashes or lesions.   Not Jaundiced  Neurologic: Speech clear, facial expressions symmetrical, bilateral UE/LE strength 5/5 without drift, PERRLA, EOMs   Intact    Data Review:   Recent Results (from the past 24 hour(s))   CBC WITH AUTOMATED DIFF    Collection Time: 17  7:42 PM   Result Value Ref Range    WBC 4.8 4.3 - 11.1 K/uL    RBC 4.33 4.05 - 5.25 M/uL    HGB 13.3 11.7 - 15.4 g/dL    HCT 43.5 35.8 - 46.3 %    .5 (H) 79.6 - 97.8 FL    MCH 30.7 26.1 - 32.9 PG    MCHC 30.6 (L) 31.4 - 35.0 g/dL    RDW 15.2 (H) 11.9 - 14.6 %    PLATELET 905 789 - 599 K/uL    MPV 12.3 10.8 - 14.1 FL    DF AUTOMATED      NEUTROPHILS 74 43 - 78 %    LYMPHOCYTES 15 13 - 44 %    MONOCYTES 9 4.0 - 12.0 %    EOSINOPHILS 1 0.5 - 7.8 %    BASOPHILS 0 0.0 - 2.0 %    IMMATURE GRANULOCYTES 0.6 0.0 - 5.0 %    ABS. NEUTROPHILS 3.6 1.7 - 8.2 K/UL    ABS. LYMPHOCYTES 0.7 0.5 - 4.6 K/UL    ABS. MONOCYTES 0.4 0.1 - 1.3 K/UL    ABS. EOSINOPHILS 0.0 0.0 - 0.8 K/UL    ABS. BASOPHILS 0.0 0.0 - 0.2 K/UL    ABS. IMM. GRANS. 0.0 0.0 - 0.5 K/UL   METABOLIC PANEL, COMPREHENSIVE    Collection Time: 01/09/17  7:42 PM   Result Value Ref Range    Sodium 132 (L) 136 - 145 mmol/L    Potassium 5.2 (H) 3.5 - 5.1 mmol/L    Chloride 96 (L) 98 - 107 mmol/L    CO2 25 21 - 32 mmol/L    Anion gap 11 7 - 16 mmol/L    Glucose 662 (HH) 65 - 100 mg/dL    BUN 72 (H) 8 - 23 MG/DL    Creatinine 10.00 (H) 0.6 - 1.0 MG/DL    GFR est AA 5 (L) >60 ml/min/1.73m2    GFR est non-AA 4 (L) >60 ml/min/1.73m2    Calcium 8.8 8.3 - 10.4 MG/DL    Bilirubin, total 0.8 0.2 - 1.1 MG/DL    ALT 14 12 - 65 U/L    AST 21 15 - 37 U/L    Alk. phosphatase 415 (H) 50 - 136 U/L    Protein, total 8.1 6.3 - 8.2 g/dL    Albumin 3.5 3.2 - 4.6 g/dL    Globulin 4.6 (H) 2.3 - 3.5 g/dL    A-G Ratio 0.8 (L) 1.2 - 3.5     PROTHROMBIN TIME + INR    Collection Time: 01/09/17  7:42 PM   Result Value Ref Range    Prothrombin time 13.2 (H) 9.6 - 12.0 sec    INR 1.2 0.9 - 1.2         Assessment and Plan:   Knee pain    PT will need to wear her knee brace she has at home. DC with pain meds  Needs Ortho f/u on DC    Pt does not meet criteria for admission. Time spent with pt 45 min  Notes, labs, VS, diagnostic testing reviewed  Case discussed with pt, care team, Dr. Ariana Cunningham, Dr. Fadumo Griffin  Full code    Thank you for this consult.       Teri Ramirez NP

## 2017-01-10 NOTE — ED NOTES
I have reviewed medications, follow up provider options, and discharge instructions with the patient. The patient verbalized understanding. Copy of discharge information given to patient upon discharge. Prescription(s) given to patient. Patient discharged in no distress. Patient instructed not to drive while under influence of narcotic pain medication. Patient taken to waiting area via wheelchair; pt has personal walker in her possession.  No questions at this time

## 2017-01-10 NOTE — PROGRESS NOTES
Attempted to call patient at both phone numbers listed in demographics to discuss options of Home Health, per MD request / three phone numbers provided not in service and one other phone number of a family member there is no answer / will re-attempt this phone number later to try and reach patient.

## 2017-01-10 NOTE — DISCHARGE INSTRUCTIONS
Use walker and knee brace  Pain meds as needed - the fewer the better  Follow up with your Dr's     Joint Pain: Care Instructions  Your Care Instructions  Many people have small aches and pains from overuse or injury to muscles and joints. Joint injuries often happen during sports or recreation, work tasks, or projects around the home. An overuse injury can happen when you put too much stress on a joint or when you do an activity that stresses the joint over and over, such as using the computer or rowing a boat. You can take action at home to help your muscles and joints get better. You should feel better in 1 to 2 weeks, but it can take 3 months or more to heal completely. Follow-up care is a key part of your treatment and safety. Be sure to make and go to all appointments, and call your doctor if you are having problems. It's also a good idea to know your test results and keep a list of the medicines you take. How can you care for yourself at home? · Do not put weight on the injured joint for at least a day or two. · For the first day or two after an injury, do not take hot showers or baths, and do not use hot packs. The heat could make swelling worse. · Put ice or a cold pack on the sore joint for 10 to 20 minutes at a time. Try to do this every 1 to 2 hours for the next 3 days (when you are awake) or until the swelling goes down. Put a thin cloth between the ice and your skin. · Wrap the injury in an elastic bandage. Do not wrap it too tightly because this can cause more swelling. · Prop up the sore joint on a pillow when you ice it or anytime you sit or lie down during the next 3 days. Try to keep it above the level of your heart. This will help reduce swelling. · Take an over-the-counter pain medicine, such as acetaminophen (Tylenol), ibuprofen (Advil, Motrin), or naproxen (Aleve). Read and follow all instructions on the label. · After 1 or 2 days of rest, begin moving the joint gently.  While the joint is still healing, you can begin to exercise using activities that do not strain or hurt the painful joint. When should you call for help? Call your doctor now or seek immediate medical care if:  · You have signs of infection, such as:  ¨ Increased pain, swelling, warmth, and redness. ¨ Red streaks leading from the joint. ¨ A fever. Watch closely for changes in your health, and be sure to contact your doctor if:  · Your movement or symptoms are not getting better after 1 to 2 weeks of home treatment. Where can you learn more? Go to http://dayna-ijeoma.info/. Enter P205 in the search box to learn more about \"Joint Pain: Care Instructions. \"  Current as of: May 23, 2016  Content Version: 11.1  © 2268-4489 [employer]. Care instructions adapted under license by [employer] (which disclaims liability or warranty for this information). If you have questions about a medical condition or this instruction, always ask your healthcare professional. [place] [address] any warranty or liability for your use of this information. Knee Pain or Injury: Care Instructions  Your Care Instructions    Injuries are a common cause of knee problems. Sudden (acute) injuries may be caused by a direct blow to the knee. They can also be caused by abnormal twisting, bending, or falling on the knee. Pain, bruising, or swelling may be severe, and may start within minutes of the injury. Overuse is another cause of knee pain. Other causes are climbing stairs, kneeling, and other activities that use the knee. Everyday wear and tear, especially as you get older, also can cause knee pain. Rest, along with home treatment, often relieves pain and allows your knee to heal. If you have a serious knee injury, you may need tests and treatment. Follow-up care is a key part of your treatment and safety.  Be sure to make and go to all appointments, and call your doctor if you are having problems. It's also a good idea to know your test results and keep a list of the medicines you take. How can you care for yourself at home? · Be safe with medicines. Read and follow all instructions on the label. ¨ If the doctor gave you a prescription medicine for pain, take it as prescribed. ¨ If you are not taking a prescription pain medicine, ask your doctor if you can take an over-the-counter medicine. · Rest and protect your knee. Take a break from any activity that may cause pain. · Put ice or a cold pack on your knee for 10 to 20 minutes at a time. Put a thin cloth between the ice and your skin. · Prop up a sore knee on a pillow when you ice it or anytime you sit or lie down for the next 3 days. Try to keep it above the level of your heart. This will help reduce swelling. · If your knee is not swollen, you can put moist heat, a heating pad, or a warm cloth on your knee. · If your doctor recommends an elastic bandage, sleeve, or other type of support for your knee, wear it as directed. · Follow your doctor's instructions about how much weight you can put on your leg. Use a cane, crutches, or a walker as instructed. · Follow your doctor's instructions about activity during your healing process. If you can do mild exercise, slowly increase your activity. · Reach and stay at a healthy weight. Extra weight can strain the joints, especially the knees and hips, and make the pain worse. Losing even a few pounds may help. When should you call for help? Call 911 anytime you think you may need emergency care. For example, call if:  · You have symptoms of a blood clot in your lung (called a pulmonary embolism). These may include:  ¨ Sudden chest pain. ¨ Trouble breathing. ¨ Coughing up blood. Call your doctor now or seek immediate medical care if:  · You have severe or increasing pain. · Your leg or foot turns cold or changes color. · You cannot stand or put weight on your knee.   · Your knee looks twisted or bent out of shape. · You cannot move your knee. · You have signs of infection, such as:  ¨ Increased pain, swelling, warmth, or redness. ¨ Red streaks leading from the knee. ¨ Pus draining from a place on your knee. ¨ A fever. · You have signs of a blood clot in your leg (called a deep vein thrombosis), such as:  ¨ Pain in your calf, back of the knee, thigh, or groin. ¨ Redness and swelling in your leg or groin. Watch closely for changes in your health, and be sure to contact your doctor if:  · You have tingling, weakness, or numbness in your knee. · You have any new symptoms, such as swelling. · You have bruises from a knee injury that last longer than 2 weeks. · You do not get better as expected. Where can you learn more? Go to http://dayna-ijeoma.info/. Enter K195 in the search box to learn more about \"Knee Pain or Injury: Care Instructions. \"  Current as of: May 27, 2016  Content Version: 11.1  © 7858-5906 [employer]. Care instructions adapted under license by [employer] (which disclaims liability or warranty for this information). If you have questions about a medical condition or this instruction, always ask your healthcare professional. [address] any warranty or liability for your use of this information. Preventing Falls: Care Instructions  Your Care Instructions  Getting around your home safely can be a challenge if you have injuries or health problems that make it easy for you to fall. Loose rugs and furniture in walkways are among the dangers for many older people who have problems walking or who have poor eyesight. People who have conditions such as arthritis, osteoporosis, or dementia also have to be careful not to fall. You can make your home safer with a few simple measures. Follow-up care is a key part of your treatment and safety.  Be sure to make and go to all appointments, and call your doctor if you are having problems. It's also a good idea to know your test results and keep a list of the medicines you take. How can you care for yourself at home? Taking care of yourself  · You may get dizzy if you do not drink enough water. To prevent dehydration, drink plenty of fluids, enough so that your urine is light yellow or clear like water. Choose water and other caffeine-free clear liquids. If you have kidney, heart, or liver disease and have to limit fluids, talk with your doctor before you increase the amount of fluids you drink. · Exercise regularly to improve your strength, muscle tone, and balance. Walk if you can. Swimming may be a good choice if you cannot walk easily. · Have your vision and hearing checked each year or any time you notice a change. If you have trouble seeing and hearing, you might not be able to avoid objects and could lose your balance. · Know the side effects of the medicines you take. Ask your doctor or pharmacist whether the medicines you take can affect your balance. Sleeping pills or sedatives can affect your balance. · Limit the amount of alcohol you drink. Alcohol can impair your balance and other senses. · Ask your doctor whether calluses or corns on your feet need to be removed. If you wear loose-fitting shoes because of calluses or corns, you can lose your balance and fall. · Talk to your doctor if you have numbness in your feet. Preventing falls at home  · Remove raised doorway thresholds, throw rugs, and clutter. Repair loose carpet or raised areas in the floor. · Move furniture and electrical cords to keep them out of walking paths. · Use nonskid floor wax, and wipe up spills right away, especially on ceramic tile floors. · If you use a walker or cane, put rubber tips on it. If you use crutches, clean the bottoms of them regularly with an abrasive pad, such as steel wool. · Keep your house well lit, especially [place], and outside walkways. Use night-lights in areas such as hallways and bathrooms. Add extra light switches or use remote switches (such as switches that go on or off when you clap your hands) to make it easier to turn lights on if you have to get up during the night. · Install sturdy handrails on stairways. · Move items in your cabinets so that the things you use a lot are on the lower shelves (about waist level). · Keep a cordless phone and a flashlight with new batteries by your bed. If possible, put a phone in each of the main rooms of your house, or carry a cell phone in case you fall and cannot reach a phone. Or, you can wear a device around your neck or wrist. You push a button that sends a signal for help. · Wear low-heeled shoes that fit well and give your feet good support. Use footwear with nonskid soles. Check the heels and soles of your shoes for wear. Repair or replace worn heels or soles. · Do not wear socks without shoes on wood floors. · Walk on the grass when the sidewalks are slippery. If you live in an area that gets snow and ice in the winter, sprinkle salt on slippery steps and sidewalks. Preventing falls in the bath  · Install grab bars and nonskid mats inside and outside your shower or tub and near the toilet and sinks. · Use shower chairs and bath benches. · Use a hand-held shower head that will allow you to sit while showering. · Get into a tub or shower by putting the weaker leg in first. Get out of a tub or shower with your strong side first.  · Repair loose toilet seats and consider installing a raised toilet seat to make getting on and off the toilet easier. · Keep your bathroom door unlocked while you are in the shower. Where can you learn more? Go to http://dayna-ijeoma.info/. Enter [id number] [id number] in the search box to learn more about \"Preventing Falls: Care Instructions. \"  Current as of: August 4, 2016  Content Version: 11.1  © 8170-3639 [employer].  Care instructions adapted under license by [employer] (which disclaims liability or warranty for this information). If you have questions about a medical condition or this instruction, always ask your healthcare professional. [address] any warranty or liability for your use of this information.

## 2017-01-11 NOTE — ED PROVIDER NOTES
Patient is a 59 y.o. female presenting with fall. The history is provided by the patient. Fall   Incident onset: multiple falls in the last 2 weeks. The fall occurred while standing and while walking. She fell from a height of ground level. She landed on hard floor. There was no blood loss. The point of impact was the right shoulder and right knee. The pain is moderate. She was ambulatory at the scene. Associated symptoms include extremity weakness. Pertinent negatives include no fever, no numbness, no abdominal pain, no nausea, no vomiting, no headaches, no loss of consciousness and no tingling. Risk factors: dialysis patient. Associated symptoms comments: She has felt weak on the right arm and leg for about a week. Exacerbated by: using a walker 2-3 weeks ago shortly after the falls began. She has tried nothing (Patient recently saw her PMD sometime in the last 2-3 weeks regarding the falls and when asked what he said it did, patient states \"nothing\". ) for the symptoms. The treatment provided no relief.         Past Medical History:   Diagnosis Date    A-V fistula ([facility] [address] 12/10/2015    Anemia 9/14/2010    Asthma     CAD (coronary artery disease)     Chronic anemia     Chronic diastolic congestive heart failure ([facility] [address]      02/2015:  EF 65%     Chronic kidney disease     Chronic pain of right knee     Dermatophytosis of nail 12/16/2016    Dyslipidemia     Edema of foot 12/16/2016    End stage renal disease on dialysis [facility])     ESRD on dialysis ([facility] [address] 4/1/2015    GERD (gastroesophageal reflux disease)     H/O cardiac catheterization 2013    H/O enucleation of left eyeball     History of DVT of lower extremity 2015     left    History of GI bleed 2013    History of PTCA 2009    HTN (hypertension), malignant 9/14/2010    Hyperlipidemia 12/16/2016    IBS (irritable bowel syndrome) 4/1/2015    Kidney disease 12/16/2016    Left renal mass     Medical non-compliance 6/1/2016    Multinodular thyroid  Osteoarthritis 2015    Poor historian     Postsurgical percutaneous transluminal coronary angioplasty (PTCA) status 2015    Prurigo nodularis     S/P CABG (coronary artery bypass graft) 2010    S/P CABG x 1     S/P total knee arthroplasty      Right    SOB (shortness of breath) 2015    Thyroid nodule 2014    Type 2 diabetes mellitus without complication ([facility] [address]        Past Surgical History:   Procedure Laterality Date    Hx total abdominal hysterectomy      Hx colonoscopy      Hx heart catheterization       cardiac cath 3-21-13, CABG x -in [facility]-vessels too small to put stents in    Hx vascular access  13    Hx ptca      Vascular surgery procedure unlist Left 2013     Left Arm AV graft    Hx heent Left 2012     Left Eye Enucleation    Hx coronary artery bypass graft  2008     x 1 vessel    Hx hysterectomy      Hx endoscopy      Hx mohs procedure Right     Hx knee arthroscopy Right 2015    Hx orthopaedic       L knee biopsy    Hx  section       1    Hx heart valve surgery           Family History:   Problem Relation Age of Onset    Heart Disease Mother     Arrhythmia Mother     Heart Disease Father     Heart Attack Father 48     MI    Diabetes Sister     Hypertension Brother     Diabetes Brother     Diabetes Sister     Diabetes Sister     Diabetes Son     Cancer Other      Family Hx of Cancer       Social History     Social History    Marital status: SINGLE     Spouse name: N/A    Number of children: 1    Years of education: N/A     Occupational History    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 HENT: Negative for rhinorrhea and sore throat. Eyes: Negative for discharge and redness. Respiratory: Negative for cough and shortness of breath. Cardiovascular: Negative for chest pain and palpitations. Gastrointestinal: Negative for abdominal pain, diarrhea, nausea and vomiting. Musculoskeletal: Positive for extremity weakness. Negative for arthralgias and back pain. Skin: Negative for rash. Neurological: Positive for weakness. Negative for dizziness, tingling, loss of consciousness, numbness and headaches. No dizziness or fainting, patient states her legs just suddenly seemed to go  weak and give-out, from underneath her   All other systems reviewed and are negative. Vitals:    01/09/17 1501 01/09/17 1959 01/09/17 2227   BP: 131/74  (!) 119/94   Pulse: 74  70   Resp: 18  16   Temp: 97.4 °F (36.3 °C)     SpO2: 93% 93% 96%   Weight: 54.4 kg (120 lb)     Height: 5' 2\" (1.575 m)              Physical Exam   Constitutional: She is oriented to person, place, and time. She appears well-developed and well-nourished. No distress. HENT:   Head: Normocephalic and atraumatic. Eyes: Conjunctivae are normal. Pupils are equal, round, and reactive to light. Right eye exhibits no discharge. Left eye exhibits no discharge. No scleral icterus. Neck: Normal range of motion. Neck supple. Cardiovascular: Normal rate, regular rhythm and normal heart sounds. Exam reveals no gallop. No murmur heard. Pulmonary/Chest: Effort normal and breath sounds normal. No respiratory distress. She has no wheezes. She has no rales. Abdominal: Soft. Bowel sounds are normal. There is no tenderness. There is no guarding. Musculoskeletal: She exhibits tenderness. She exhibits no edema. Right shoulder: She exhibits decreased range of motion. Right knee: She exhibits decreased range of motion and swelling. Tenderness found.    Neurological: She is alert and oriented to person, place, and time. She exhibits normal muscle tone. cni 2-12 grossly  Pseudo-right pronator drift, which seems to be more related to shoulder pain. Her biceps, triceps and  are equal bilaterally    sensory  Exam to arms and legs is equal bilaterally    Patient exhibits decreased straight leg raise strength on the right, however, plantar flexion, dorsiflexion equal bilaterally. I think this apparent leg weakness is due to pain as well. Skin: Skin is warm and dry. She is not diaphoretic. Psychiatric: She has a normal mood and affect. Her behavior is normal.   Nursing note and vitals reviewed. MDM  Number of Diagnoses or Management Options  Frequent falls:   Right knee pain, unspecified chronicity:   Right shoulder pain, unspecified chronicity:   Diagnosis management comments: Medical decision making note:  Patient seems weak at the right arm and leg, but this seems to be more related to knee pain and shoulder pain. CT scan is unrevealing. Patient has no other signs to suggest CVA. Patient seen by hospitalist, who concurs  Encouraged to wear her knee brace, continue with the walker, pain medicines and been prescribed, and is recommended that she follow up with orthopedist for evaluation of the knee pain, she is status post right total knee arthroplasty. This concludes the \"medical decision making note\" part of this emergency department visit note.       ED Course       Procedures

## 2017-02-27 ENCOUNTER — APPOINTMENT (OUTPATIENT)
Dept: ULTRASOUND IMAGING | Age: 62
End: 2017-02-27
Attending: EMERGENCY MEDICINE
Payer: MEDICARE

## 2017-02-27 ENCOUNTER — HOSPITAL ENCOUNTER (EMERGENCY)
Age: 62
Discharge: HOME | End: 2017-02-27
Attending: EMERGENCY MEDICINE
Payer: MEDICARE

## 2017-02-27 VITALS
WEIGHT: 120 LBS | HEIGHT: 62 IN | HEART RATE: 84 BPM | OXYGEN SATURATION: 96 % | BODY MASS INDEX: 22.08 KG/M2 | RESPIRATION RATE: 16 BRPM | SYSTOLIC BLOOD PRESSURE: 178 MMHG | DIASTOLIC BLOOD PRESSURE: 90 MMHG | TEMPERATURE: 98.1 F

## 2017-02-27 DIAGNOSIS — T82.7XXA AV FISTULA INFECTION, INITIAL ENCOUNTER (HCC): Primary | ICD-10-CM

## 2017-02-27 PROCEDURE — 96372 THER/PROPH/DIAG INJ SC/IM: CPT | Performed by: EMERGENCY MEDICINE

## 2017-02-27 PROCEDURE — 74011250636 HC RX REV CODE- 250/636: Performed by: EMERGENCY MEDICINE

## 2017-02-27 PROCEDURE — 99284 EMERGENCY DEPT VISIT MOD MDM: CPT | Performed by: EMERGENCY MEDICINE

## 2017-02-27 PROCEDURE — 93990 DOPPLER FLOW TESTING: CPT

## 2017-02-27 RX ORDER — HYDROMORPHONE HYDROCHLORIDE 1 MG/ML
1 INJECTION, SOLUTION INTRAMUSCULAR; INTRAVENOUS; SUBCUTANEOUS
Status: DISCONTINUED | OUTPATIENT
Start: 2017-02-27 | End: 2017-02-27

## 2017-02-27 RX ORDER — HYDROCODONE BITARTRATE AND ACETAMINOPHEN 7.5; 325 MG/1; MG/1
1 TABLET ORAL
Qty: 17 TAB | Refills: 0 | Status: SHIPPED | OUTPATIENT
Start: 2017-02-27 | End: 2017-06-07 | Stop reason: CLARIF

## 2017-02-27 RX ORDER — HYDROMORPHONE HYDROCHLORIDE 1 MG/ML
1 INJECTION, SOLUTION INTRAMUSCULAR; INTRAVENOUS; SUBCUTANEOUS
Status: COMPLETED | OUTPATIENT
Start: 2017-02-27 | End: 2017-02-27

## 2017-02-27 RX ADMIN — HYDROMORPHONE HYDROCHLORIDE 1 MG: 1 INJECTION, SOLUTION INTRAMUSCULAR; INTRAVENOUS; SUBCUTANEOUS at 15:56

## 2017-02-27 NOTE — ED PROVIDER NOTES
HPI Comments: Presents with complaint of left forearm swelling and pain after 2 hours of dialysis. After removal of the cannula she reports she bled a lot. She was sent here for evaluation of her vascular access. No active bleeding currently. Patient is a 57 y.o. female presenting with vascular access problem. The history is provided by the patient. Vascular Access Problem    This is a new problem. The current episode started 1 to 2 hours ago. The problem occurs constantly. The problem has been gradually worsening. The pain is present in the left arm. The quality of the pain is described as aching and pounding. The pain is moderate. Associated symptoms include stiffness. Pertinent negatives include no numbness. She has tried nothing for the symptoms. There has been no history of extremity trauma.         Past Medical History:   Diagnosis Date    A-V fistula ([facility] 12/10/2015    Anemia 9/14/2010    Asthma     CAD (coronary artery disease)     Chronic anemia     Chronic diastolic congestive heart failure ([facility]     02/2015:  EF 65%     Chronic kidney disease     Chronic pain of right knee     Dermatophytosis of nail 12/16/2016    Dyslipidemia     Edema of foot 12/16/2016    End stage renal disease on dialysis [facility])     ESRD on dialysis ([facility] 4/1/2015    GERD (gastroesophageal reflux disease)     H/O cardiac catheterization 2013    H/O enucleation of left eyeball     History of DVT of lower extremity 2015    left    History of GI bleed 2013    History of PTCA 2009    HTN (hypertension), malignant 9/14/2010    Hyperlipidemia 12/16/2016    IBS (irritable bowel syndrome) 4/1/2015    Kidney disease 12/16/2016    Left renal mass     Medical non-compliance 6/1/2016    Multinodular thyroid     Osteoarthritis 5/12/2015    Poor historian     Postsurgical percutaneous transluminal coronary angioplasty (PTCA) status 12/4/2015    Prurigo nodularis     S/P CABG (coronary artery bypass graft) 2010    S/P CABG x 1     S/P total knee arthroplasty     Right    SOB (shortness of breath) 2015    Thyroid nodule 2014    Type 2 diabetes mellitus without complication ([facility] 4789       Past Surgical History:   Procedure Laterality Date    HX  SECTION      1    HX COLONOSCOPY      HX CORONARY ARTERY BYPASS GRAFT  2008    x 1 vessel    HX ENDOSCOPY      HX HEART CATHETERIZATION      cardiac cath 3-21-13, CABG x 1 07-in @ [facility]-vessels too small to put stents in   [address] [facility] HX HEENT Left 2012    Left Eye Enucleation    HX HYSTERECTOMY      HX KNEE ARTHROSCOPY Right 2015    HX MOHS PROCEDURES Right     HX ORTHOPAEDIC      L knee biopsy    HX PTCA      HX TOTAL ABDOMINAL HYSTERECTOMY      HX VASCULAR ACCESS  13    VASCULAR SURGERY PROCEDURE UNLIST Left 2013    Left Arm AV graft         Family History:   Problem Relation Age of Onset    Heart Disease Mother     Arrhythmia Mother     Heart Disease Father     Heart Attack Father 48     MI    Diabetes Sister     Hypertension Brother     Diabetes Brother     Diabetes Sister     Diabetes Sister     Diabetes Son     Cancer Other      Family Hx of Cancer       Social History     Social History    Marital status: SINGLE     Spouse name: N/A    Number of children: 1    Years of education: N/A     Occupational History    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 Musculoskeletal: Positive for stiffness. Neurological: Negative for numbness.        Vitals:    17 1448 17 1557   BP: 197/78 188/76   Pulse: 70 87 Resp: 16 16   Temp: 98.7 °F (37.1 °C)    SpO2: 95% 96%   Weight: 54.4 kg (120 lb)    Height: 5' 2\" (1.575 m)             Physical Exam   Constitutional: She is oriented to person, place, and time. She appears well-developed and well-nourished. No distress. HENT:   Head: Normocephalic and atraumatic. Musculoskeletal: She exhibits edema and tenderness. Positive thrill over graft, forearm very swollen     Neurological: She is alert and oriented to person, place, and time. Skin: Skin is warm and dry. She is not diaphoretic. No erythema. Psychiatric: She has a normal mood and affect. Her behavior is normal.   Nursing note and vitals reviewed. MDM  Number of Diagnoses or Management Options  AV fistula infection, initial encounter [facility]):   Diagnosis management comments: Ultrasound with hematoma. Discussed with Dr. Bradly Lehman and patient can go. Per consult patient will need temporary dialysis catheter and Dr. Annemarie Pickett was contacted by [facility].        Amount and/or Complexity of Data Reviewed  Discuss the patient with other providers: yes    Risk of Complications, Morbidity, and/or Mortality  Presenting problems: moderate  Diagnostic procedures: moderate  Management options: moderate    Patient Progress  Patient progress: improved    ED Course       Procedures

## 2017-02-27 NOTE — ED TRIAGE NOTES
EMS reports patient was sent from dialysis for a possible shunt rupture that occurred around 11 am.  Left arm is swollen and painful.

## 2017-02-27 NOTE — CONSULTS
Asha Farah   [address] [facility], [address] [place] [address]. . [address] FAX: [phone]    Vascular Surgery Consult    Subjective:      Venu Kraus is a 57 y.o. female who presents for evaluation of her left forearm loop graft. The patient runs on HD MWF and she reports that Dr. Suzan Crews placed this graft for roughly 3-4 years and has never had any difficulty until today. She reports that the HD center stuck her graft and that she immediately had swelling and bleeding. She was able to run for 1 hour on HD before they had to discontinue her treatment and transfer her here for evaluation.       Past Medical History:   Diagnosis Date    A-V fistula ([facility] [address] 12/10/2015    Anemia 9/14/2010    Asthma     CAD (coronary artery disease)     Chronic anemia     Chronic diastolic congestive heart failure ([address]     02/2015:  EF 65%     Chronic kidney disease     Chronic pain of right knee     Dermatophytosis of nail 12/16/2016    Dyslipidemia     Edema of foot 12/16/2016    End stage renal disease on dialysis [facility])     ESRD on dialysis ([address] 4/1/2015    GERD (gastroesophageal reflux disease)     H/O cardiac catheterization 2013    H/O enucleation of left eyeball     History of DVT of lower extremity 2015    left    History of GI bleed 2013    History of PTCA 2009    HTN (hypertension), malignant 9/14/2010    Hyperlipidemia 12/16/2016    IBS (irritable bowel syndrome) 4/1/2015    Kidney disease 12/16/2016    Left renal mass     Medical non-compliance 6/1/2016    Multinodular thyroid     Osteoarthritis 5/12/2015    Poor historian     Postsurgical percutaneous transluminal coronary angioplasty (PTCA) status 12/4/2015    Prurigo nodularis     S/P CABG (coronary artery bypass graft) 7/28/2010    S/P CABG x 1 2008    S/P total knee arthroplasty     Right    SOB (shortness of breath) 12/4/2015    Thyroid nodule 8/19/2014    Type 2 diabetes mellitus without complication ([address] 3/51/9726     Past Surgical History:   Procedure Laterality Date    HX  SECTION      1    HX COLONOSCOPY      HX CORONARY ARTERY BYPASS GRAFT  2008    x 1 vessel    HX ENDOSCOPY      HX HEART CATHETERIZATION      cardiac cath 3-21-13, CABG x 1 '07-in @ [facility]-vessels too small to put stents in   [address] [facility] HX HEENT Left 2012    Left Eye Enucleation    HX HYSTERECTOMY      HX KNEE ARTHROSCOPY Right 2015    HX MOHS PROCEDURES Right     HX ORTHOPAEDIC      L knee biopsy    HX PTCA      HX TOTAL ABDOMINAL HYSTERECTOMY      HX VASCULAR ACCESS  13    VASCULAR SURGERY PROCEDURE UNLIST Left 2013    Left Arm AV graft      Family History   Problem Relation Age of Onset    Heart Disease Mother     Arrhythmia Mother     Heart Disease Father     Heart Attack Father 48     MI    Diabetes Sister     Hypertension Brother     Diabetes Brother     Diabetes Sister     Diabetes Sister     Diabetes Son     Cancer Other      Family Hx of Cancer     Social History     Social History    Marital status: SINGLE     Spouse name: N/A    Number of children: 1    Years of education: N/A     Occupational History    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 Current Outpatient Prescriptions   Medication Sig    HYDROcodone-acetaminophen (NORCO) 5-325 mg per tablet Take 1-2 Tabs by mouth every eight (8) hours as needed for Pain. Max Daily Amount: 6 Tabs.  HYDROcodone-acetaminophen (NORCO) 7.5-325 mg per tablet Take 1 Tab by mouth every six (6) hours as needed for Pain. Max Daily Amount: 4 Tabs.  VIT B CMPLX 3/FA/VIT C/BIOTIN (VOL-CARE RX PO) 1 mg.  Tablet, Oral    POTASSIUM CHLORIDE Potassium Chloride CR (10MEQ Capsule ER, 1 Oral every day)    gabapentin (NEURONTIN) 100 mg capsule Take 100 mg by mouth daily.  carvedilol (COREG) 6.25 mg tablet Take 6.25 mg by mouth two (2) times daily (with meals).  clopidogrel (PLAVIX) 75 mg tab Take 75 mg by mouth daily.  amLODIPine (NORVASC) 10 mg tablet Take 10 mg by mouth daily.  lisinopril (PRINIVIL, ZESTRIL) 10 mg tablet Take 10 mg by mouth daily.  ipratropium-albuterol (COMBIVENT)  mcg/actuation inhaler Take  by inhalation as needed.  predniSONE (STERAPRED DS) 10 mg dose pack TAKE AS DIRECTED    pantoprazole (PROTONIX) 40 mg tablet Take 40 mg by mouth daily.  ALPRAZolam (XANAX) 1 mg tablet Take 1 Tab by mouth daily.  clindamycin (CLINDAGEL) 1 % topical gel Apply  to affected area daily. Apply to lesion on hand every day.  hydrOXYzine (ATARAX) 25 mg tablet Take 1 Tab by mouth every six (6) hours as needed.  temazepam (RESTORIL) 15 mg capsule Take 1 Cap by mouth nightly.  pravastatin (PRAVACHOL) 80 mg tablet Take 80 mg by mouth nightly.  sevelamer carbonate (RENVELA) 800 mg tab tab Take  by mouth three (3) times daily.  cinacalcet (SENSIPAR) 30 mg tablet Take 30 mg by mouth daily.  nitroglycerin (NITRODUR) 0.4 mg/hr 1 Patch by TransDERmal route daily as needed.  furosemide (LASIX) 80 mg tablet Take 80 mg by mouth two (2) times a day.  albuterol (PROAIR HFA) 90 mcg/actuation inhaler Take 2 Puffs by inhalation every four (4) hours as needed for Wheezing.  insulin NPH/insulin regular (NOVOLIN 70/30) 100 unit/mL (70-30) injection 25 Units by SubCUTAneous route two (2) times a day.  isosorbide mononitrate ER (IMDUR) 60 mg CR tablet Take 60 mg by mouth daily. Instructed to take DOS per anesthesia protocol with a sip of water.  aspirin delayed-release 81 mg tablet Take 81 mg by mouth daily. Instructed to take DOS per anesthesia protocol with a sip of water.     zolpidem (AMBIEN) 10 mg tablet Take 10 mg by mouth nightly. Allergies   Allergen Reactions    Pcn [Penicillins] Swelling       Review of Systems:  A comprehensive review of systems was negative except for that written in the History of Present Illness. Objective:     Patient Vitals for the past 8 hrs:   BP Temp Pulse Resp SpO2 Height Weight   17 1557 188/76 - 87 16 96 % - -   17 1448 197/78 98.7 °F (37.1 °C) 70 16 95 % 5' 2\" (1.575 m) 120 lb (54.4 kg)     Temp (24hrs), Av.7 °F (37.1 °C), Min:98.7 °F (37.1 °C), Max:98.7 °F (37.1 °C)      Physical Exam:  GENERAL: alert, cooperative, no distress, appears stated age, LUNG: clear to auscultation bilaterally, HEART: regular rate and rhythm, S1, S2 normal, no murmur, click, rub or gallop, EXTREMITIES:  edema 2-3+ to left forearm loop graft and hand. Palpable radial pulse. Assessment/Plan:   -Ultrasound to assess graft  -Elevate extremity as much as possible. -Needs temp HD catheter for HD until swelling subsides in her arm. I called [facility] [facility] and notified them of the need for the temp HD catheter and they were going to relay the message to Dr. Isreal Burgos who was in the office today. Patient can be scheduled for temp HD as an outpatient. Signed By: Arvin Keenan NP     2017       Elements of this note have been dictated using speech recognition software. As a result, errors of speech recognition may have occurred.

## 2017-03-30 ENCOUNTER — APPOINTMENT (RX ONLY)
Dept: URBAN - METROPOLITAN AREA CLINIC 349 | Facility: CLINIC | Age: 62
Setting detail: DERMATOLOGY
End: 2017-03-30

## 2017-03-30 DIAGNOSIS — L20.89 OTHER ATOPIC DERMATITIS: ICD-10-CM | Status: UNCHANGED

## 2017-03-30 DIAGNOSIS — L85.3 XEROSIS CUTIS: ICD-10-CM

## 2017-03-30 PROBLEM — I10 ESSENTIAL (PRIMARY) HYPERTENSION: Status: ACTIVE | Noted: 2017-03-30

## 2017-03-30 PROBLEM — L30.9 DERMATITIS, UNSPECIFIED: Status: ACTIVE | Noted: 2017-03-30

## 2017-03-30 PROBLEM — L29.8 OTHER PRURITUS: Status: ACTIVE | Noted: 2017-03-30

## 2017-03-30 PROCEDURE — ? INTRAMUSCULAR KENALOG

## 2017-03-30 PROCEDURE — ? COUNSELING

## 2017-03-30 PROCEDURE — 96372 THER/PROPH/DIAG INJ SC/IM: CPT

## 2017-03-30 PROCEDURE — 99213 OFFICE O/P EST LOW 20 MIN: CPT | Mod: 25

## 2017-03-30 ASSESSMENT — LOCATION SIMPLE DESCRIPTION DERM
LOCATION SIMPLE: RIGHT BUTTOCK
LOCATION SIMPLE: ABDOMEN

## 2017-03-30 ASSESSMENT — LOCATION ZONE DERM: LOCATION ZONE: TRUNK

## 2017-03-30 ASSESSMENT — LOCATION DETAILED DESCRIPTION DERM
LOCATION DETAILED: RIGHT BUTTOCK
LOCATION DETAILED: EPIGASTRIC SKIN

## 2017-04-28 ENCOUNTER — HOSPITAL ENCOUNTER (EMERGENCY)
Age: 62
Discharge: HOME | End: 2017-04-28
Attending: EMERGENCY MEDICINE
Payer: MEDICARE

## 2017-04-28 ENCOUNTER — APPOINTMENT (OUTPATIENT)
Dept: GENERAL RADIOLOGY | Age: 62
End: 2017-04-28
Attending: EMERGENCY MEDICINE
Payer: MEDICARE

## 2017-04-28 VITALS
SYSTOLIC BLOOD PRESSURE: 136 MMHG | BODY MASS INDEX: 22.08 KG/M2 | HEIGHT: 62 IN | WEIGHT: 120 LBS | RESPIRATION RATE: 18 BRPM | OXYGEN SATURATION: 92 % | HEART RATE: 63 BPM | DIASTOLIC BLOOD PRESSURE: 65 MMHG | TEMPERATURE: 98.2 F

## 2017-04-28 DIAGNOSIS — S59.901A ELBOW INJURY, RIGHT, INITIAL ENCOUNTER: Primary | ICD-10-CM
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
## 2017-04-28 PROCEDURE — 99283 EMERGENCY DEPT VISIT LOW MDM: CPT | Performed by: EMERGENCY MEDICINE

## 2017-04-28 PROCEDURE — 73080 X-RAY EXAM OF ELBOW: CPT

## 2017-04-28 NOTE — ED TRIAGE NOTES
Pt complains of right elbow pain and lower back pain. Pt states that she fell and landed on concrete on her right side. Pt denies hitting her head or any LOC.

## 2017-04-28 NOTE — ED PROVIDER NOTES
HPI Comments: Here and states that she fell on concrete today on review of notes from the [facility] system had the same complaint and initially denied any recent similar events or recent visits to the emergency room. She at that time had pain to her right elbow it is the same complaints she has today. No acute injury findings are identified. no head or neck injury. No rib pain. No thoracic or lumbar spine pain. No hip pain. She is sore to movement of her right elbow but maintains full range of motion and denies any distal motor or sensory changes. Patient is a 57 y.o. female presenting with fall. The history is provided by the patient. Fall   The accident occurred less than 1 hour ago. She fell from a height of ground level. She landed on concrete. Pertinent negatives include no fever and no numbness.         Past Medical History:   Diagnosis Date    A-V fistula ([facility] 12/10/2015    Anemia 9/14/2010    Asthma     CAD (coronary artery disease)     Chronic anemia     Chronic diastolic congestive heart failure ([facility]     02/2015:  EF 65%     Chronic kidney disease     Chronic pain of right knee     Dermatophytosis of nail 12/16/2016    Dyslipidemia     Edema of foot 12/16/2016    End stage renal disease on dialysis [facility])     ESRD on dialysis ([facility] 4/1/2015    GERD (gastroesophageal reflux disease)     H/O cardiac catheterization 2013    H/O enucleation of left eyeball     History of DVT of lower extremity 2015    left    History of GI bleed 2013    History of PTCA 2009    HTN (hypertension), malignant 9/14/2010    Hyperlipidemia 12/16/2016    IBS (irritable bowel syndrome) 4/1/2015    Kidney disease 12/16/2016    Left renal mass     Medical non-compliance 6/1/2016    Multinodular thyroid     Osteoarthritis 5/12/2015    Poor historian     Postsurgical percutaneous transluminal coronary angioplasty (PTCA) status 12/4/2015    Prurigo nodularis     S/P CABG (coronary artery bypass graft) 2010    S/P CABG x 1     S/P total knee arthroplasty     Right    SOB (shortness of breath) 2015    Thyroid nodule 2014    Type 2 diabetes mellitus without complication ([facility]        Past Surgical History:   Procedure Laterality Date    HX  SECTION      1    HX COLONOSCOPY      HX CORONARY ARTERY BYPASS GRAFT  2008    x 1 vessel    HX ENDOSCOPY      HX HEART CATHETERIZATION      cardiac cath 3-21-13, CABG x -in @ [facility]-vessels too small to put stents in   [address] [facility] HX HEENT Left 2012    Left Eye Enucleation    HX HYSTERECTOMY      HX KNEE ARTHROSCOPY Right 2015    HX MOHS PROCEDURES Right     HX ORTHOPAEDIC      L knee biopsy    HX PTCA      HX TOTAL ABDOMINAL HYSTERECTOMY      HX VASCULAR ACCESS  13    VASCULAR SURGERY PROCEDURE UNLIST Left 2013    Left Arm AV graft         Family History:   Problem Relation Age of Onset    Heart Disease Mother     Arrhythmia Mother     Heart Disease Father     Heart Attack Father 48     MI    Diabetes Sister     Hypertension Brother     Diabetes Brother     Diabetes Sister     Diabetes Sister     Diabetes Son     Cancer Other      Family Hx of Cancer       Social History     Social History    Marital status: SINGLE     Spouse name: N/A    Number of children: 1    Years of education: N/A     Occupational History    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 Musculoskeletal:        See HPI   Neurological: Negative for weakness and numbness. All other systems reviewed and are negative.       Vitals: 04/28/17 1748   BP: 131/74   Pulse: 68   Resp: 18   Temp: 97.8 °F (36.6 °C)   SpO2: 93%   Weight: 54.4 kg (120 lb)   Height: 5' 2\" (1.575 m)            Physical Exam   Constitutional: She appears well-developed and well-nourished. No distress. HENT:   Head: Atraumatic. No bruises or marks to the head or neck   Eyes: No scleral icterus. Neck: Neck supple. Cardiovascular: Normal rate and regular rhythm. Pulmonary/Chest: Effort normal. No respiratory distress. She exhibits no tenderness. Abdominal: Soft. There is no tenderness. Musculoskeletal:        Right shoulder: Normal.        Left shoulder: Normal.        Left elbow: Normal.        Right wrist: Normal.        Left wrist: Normal.        Right hip: Normal.        Left hip: Normal.        Right knee: Normal.        Left knee: Normal.        Right ankle: Normal.        Left ankle: Normal.        Cervical back: Normal.        Thoracic back: Normal.        Lumbar back: Normal.   Some swelling to the right elbow but full range of motion. Neurological: She is alert. She exhibits normal muscle tone. Coordination normal.   Skin: Skin is warm and dry. Psychiatric: Thought content normal.   Nursing note and vitals reviewed. MDM  Number of Diagnoses or Management Options  Elbow injury, right, initial encounter:   Fall, initial encounter:   Diagnosis management comments: Here with a stated fall today but same exact history is recent visit to [facility] emergency room. Each time complains of right elbow pain and a fall onto concrete. She is blind.        Amount and/or Complexity of Data Reviewed  Tests in the radiology section of CPT®: reviewed and ordered  Decide to obtain previous medical records or to obtain history from someone other than the patient: yes  Independent visualization of images, tracings, or specimens: yes    Risk of Complications, Morbidity, and/or Mortality  Presenting problems: moderate  Diagnostic procedures: low  Management options: moderate    Patient Progress  Patient progress: stable    ED Course       Procedures

## 2017-04-29 NOTE — DISCHARGE INSTRUCTIONS

## 2017-05-16 ENCOUNTER — RX ONLY (OUTPATIENT)
Age: 62
Setting detail: RX ONLY
End: 2017-05-16

## 2017-05-16 RX ORDER — TRIAMCINOLONE ACETONIDE 1 MG/G
CREAM TOPICAL
Qty: 1 | Refills: 0 | Status: ERX

## 2017-06-06 ENCOUNTER — HOSPITAL ENCOUNTER (OUTPATIENT)
Dept: SURGERY | Age: 62
Discharge: HOME | End: 2017-06-06

## 2017-06-06 NOTE — PERIOP NOTES
Call to [facility] office to request orders, and to see if Plavix permission was obtained form Dr Delmi Walker at [facility]. office closed      Anesthesia will need to review EKG's 8-22-16, 5--19-15, heart cath 2-19-15, [facility] office note 9-8-16.     3794. Re-call to surgery scheduler - doesn't come in until 0830, LM on  for return call.     2020. Sascha Douglas call to PAT and stated he just got off the phone with the patient and she is not taking Plavix. Informed Sascha Douglas pt had PAT appointment @ 0900, and this will be her second no show. Informed Sascha Douglas I will call pt and return call to him. 0930. Call to pt @ [phone] to inform she will need to call DR Walls's office to reschedule PAT appointment. # D/C.

## 2017-06-06 NOTE — PERIOP NOTES
Call to pt @ [phone]. No answer and no VM set up. Call to sister, Astrid Castañeda @ [phone]. LM to inform her sister will need to reschedule PAT appointment - second no show. [facility] to inform all stated above.  LM

## 2017-06-07 ENCOUNTER — ANESTHESIA EVENT (OUTPATIENT)
Dept: SURGERY | Age: 62
End: 2017-06-07
Payer: MEDICARE

## 2017-06-07 ENCOUNTER — HOSPITAL ENCOUNTER (OUTPATIENT)
Dept: SURGERY | Age: 62
Discharge: HOME | End: 2017-06-07

## 2017-06-07 VITALS
TEMPERATURE: 97.7 F | RESPIRATION RATE: 16 BRPM | SYSTOLIC BLOOD PRESSURE: 182 MMHG | DIASTOLIC BLOOD PRESSURE: 68 MMHG | HEART RATE: 73 BPM | OXYGEN SATURATION: 94 % | HEIGHT: 62 IN | WEIGHT: 115 LBS | BODY MASS INDEX: 21.16 KG/M2

## 2017-06-07 NOTE — PERIOP NOTES
Patient verified name, , and surgery as listed in [facility]. TYPE  CASE:1b  Orders per surgeon: were Received  Labs per surgeon:Kristie CAIN  Labs per anesthesia protocol: requested labs from [facility]   EKG  :  EKG 16, Cath 02/19/15, last office note 16. Attached copy of labs from [facility]: CBC,BMP from 17. Patient provided with handouts including guide to surgery , transfusions, pain management and hand hygiene for the family and community. Pt verbalizes understanding of all pre-op instructions . Instructed that family must be present in building at all times. Nothing to eat or drink after midnight the night prior to surgery. Hibiclens and instructions given per hospital policy. Instructed patient to continue  previous medications as prescribed prior to surgery and  to take the following medications the day of surgery according to anesthesia guidelines : Amlodipine, Aspirin, Carvedilol, Gabapentin, Isosorbide, Pantoprazole. Instructed to take 1/2 morning dose of Novolog 70/30= 12 units. Preop phone number given in case of any blood surgery issues for morning of surgery. Original medication prescription bottles were not visualized during patient appointment. Continue all previous medications unless otherwise directed. Instructed patient to hold  the following medications prior to surgery: none      Patient verbalized understanding of all instructions and provided all medical/health information to the best of their ability.

## 2017-06-08 ENCOUNTER — ANESTHESIA (OUTPATIENT)
Dept: SURGERY | Age: 62
End: 2017-06-08
Payer: MEDICARE

## 2017-06-08 ENCOUNTER — HOSPITAL ENCOUNTER (OUTPATIENT)
Age: 62
Setting detail: OUTPATIENT SURGERY
Discharge: HOME | End: 2017-06-08
Attending: SURGERY | Admitting: SURGERY
Payer: MEDICARE

## 2017-06-08 VITALS
HEART RATE: 68 BPM | SYSTOLIC BLOOD PRESSURE: 111 MMHG | RESPIRATION RATE: 16 BRPM | OXYGEN SATURATION: 97 % | DIASTOLIC BLOOD PRESSURE: 55 MMHG | TEMPERATURE: 98.2 F | HEIGHT: 62 IN | BODY MASS INDEX: 21.16 KG/M2 | WEIGHT: 115 LBS

## 2017-06-08 LAB
GLUCOSE BLD STRIP.AUTO-MCNC: 225 MG/DL (ref 65–100)
GLUCOSE BLD STRIP.AUTO-MCNC: 90 MG/DL (ref 65–100)
POTASSIUM BLD-SCNC: 3.9 MMOL/L (ref 3.5–5.1)

## 2017-06-08 PROCEDURE — 82962 GLUCOSE BLOOD TEST: CPT

## 2017-06-08 PROCEDURE — 74011250636 HC RX REV CODE- 250/636: Performed by: ANESTHESIOLOGY

## 2017-06-08 PROCEDURE — 74011000250 HC RX REV CODE- 250: Performed by: SURGERY

## 2017-06-08 PROCEDURE — 74011250637 HC RX REV CODE- 250/637: Performed by: ANESTHESIOLOGY

## 2017-06-08 PROCEDURE — 77030020782 HC GWN BAIR PAWS FLX 3M -B: Performed by: ANESTHESIOLOGY

## 2017-06-08 PROCEDURE — 74011636637 HC RX REV CODE- 636/637: Performed by: ANESTHESIOLOGY

## 2017-06-08 PROCEDURE — 74011000258 HC RX REV CODE- 258: Performed by: SURGERY

## 2017-06-08 PROCEDURE — 77030002987 HC SUT PROL J&J -B: Performed by: SURGERY

## 2017-06-08 PROCEDURE — 76010000171 HC OR TIME 2 TO 2.5 HR INTENSV-TIER 1: Performed by: SURGERY

## 2017-06-08 PROCEDURE — 77030010514 HC APPL CLP LIG COVD -B: Performed by: SURGERY

## 2017-06-08 PROCEDURE — 77030002996 HC SUT SLK J&J -A: Performed by: SURGERY

## 2017-06-08 PROCEDURE — C1768 GRAFT, VASCULAR: HCPCS | Performed by: SURGERY

## 2017-06-08 PROCEDURE — 77030034888 HC SUT PROL 2 J&J -B: Performed by: SURGERY

## 2017-06-08 PROCEDURE — 74011250636 HC RX REV CODE- 250/636

## 2017-06-08 PROCEDURE — 74011250636 HC RX REV CODE- 250/636: Performed by: SURGERY

## 2017-06-08 PROCEDURE — 74011000250 HC RX REV CODE- 250: Performed by: ANESTHESIOLOGY

## 2017-06-08 PROCEDURE — 77030020143 HC AIRWY LARYN INTUB CGAS -A: Performed by: ANESTHESIOLOGY

## 2017-06-08 PROCEDURE — 76210000021 HC REC RM PH II 0.5 TO 1 HR: Performed by: SURGERY

## 2017-06-08 PROCEDURE — 77030010507 HC ADH SKN DERMBND J&J -B: Performed by: SURGERY

## 2017-06-08 PROCEDURE — 76210000006 HC OR PH I REC 0.5 TO 1 HR: Performed by: SURGERY

## 2017-06-08 PROCEDURE — 77030031139 HC SUT VCRL2 J&J -A: Performed by: SURGERY

## 2017-06-08 PROCEDURE — 77030011640 HC PAD GRND REM COVD -A: Performed by: SURGERY

## 2017-06-08 PROCEDURE — 84132 ASSAY OF SERUM POTASSIUM: CPT

## 2017-06-08 PROCEDURE — 74011000250 HC RX REV CODE- 250

## 2017-06-08 PROCEDURE — 76060000035 HC ANESTHESIA 2 TO 2.5 HR: Performed by: SURGERY

## 2017-06-08 PROCEDURE — 77030019940 HC BLNKT HYPOTHRM STRY -B: Performed by: ANESTHESIOLOGY

## 2017-06-08 DEVICE — GRAFT VASC L40CM ID6MM EPTFE STD WALLED NONRINGED STR GOR: Type: IMPLANTABLE DEVICE | Site: ARM | Status: FUNCTIONAL

## 2017-06-08 RX ORDER — SODIUM CHLORIDE 0.9 % (FLUSH) 0.9 %
5-10 SYRINGE (ML) INJECTION AS NEEDED
Status: DISCONTINUED | OUTPATIENT
Start: 2017-06-08 | End: 2017-06-08 | Stop reason: HOSPADM

## 2017-06-08 RX ORDER — DEXAMETHASONE SODIUM PHOSPHATE 4 MG/ML
INJECTION, SOLUTION INTRA-ARTICULAR; INTRALESIONAL; INTRAMUSCULAR; INTRAVENOUS; SOFT TISSUE AS NEEDED
Status: DISCONTINUED | OUTPATIENT
Start: 2017-06-08 | End: 2017-06-08 | Stop reason: HOSPADM

## 2017-06-08 RX ORDER — INSULIN LISPRO 100 [IU]/ML
6 INJECTION, SOLUTION INTRAVENOUS; SUBCUTANEOUS ONCE
Status: COMPLETED | OUTPATIENT
Start: 2017-06-08 | End: 2017-06-08

## 2017-06-08 RX ORDER — ONDANSETRON 2 MG/ML
INJECTION INTRAMUSCULAR; INTRAVENOUS AS NEEDED
Status: DISCONTINUED | OUTPATIENT
Start: 2017-06-08 | End: 2017-06-08 | Stop reason: HOSPADM

## 2017-06-08 RX ORDER — HYDROMORPHONE HYDROCHLORIDE 2 MG/ML
0.5 INJECTION, SOLUTION INTRAMUSCULAR; INTRAVENOUS; SUBCUTANEOUS
Status: DISCONTINUED | OUTPATIENT
Start: 2017-06-08 | End: 2017-06-08 | Stop reason: HOSPADM

## 2017-06-08 RX ORDER — SODIUM CHLORIDE 0.9 % (FLUSH) 0.9 %
5-10 SYRINGE (ML) INJECTION EVERY 8 HOURS
Status: DISCONTINUED | OUTPATIENT
Start: 2017-06-08 | End: 2017-06-08 | Stop reason: HOSPADM

## 2017-06-08 RX ORDER — PROPOFOL 10 MG/ML
INJECTION, EMULSION INTRAVENOUS AS NEEDED
Status: DISCONTINUED | OUTPATIENT
Start: 2017-06-08 | End: 2017-06-08 | Stop reason: HOSPADM

## 2017-06-08 RX ORDER — SODIUM CHLORIDE, SODIUM LACTATE, POTASSIUM CHLORIDE, CALCIUM CHLORIDE 600; 310; 30; 20 MG/100ML; MG/100ML; MG/100ML; MG/100ML
100 INJECTION, SOLUTION INTRAVENOUS CONTINUOUS
Status: ACTIVE | OUTPATIENT
Start: 2017-06-08 | End: 2017-06-08

## 2017-06-08 RX ORDER — SODIUM CHLORIDE 9 MG/ML
25 INJECTION, SOLUTION INTRAVENOUS CONTINUOUS
Status: DISCONTINUED | OUTPATIENT
Start: 2017-06-08 | End: 2017-06-08 | Stop reason: HOSPADM

## 2017-06-08 RX ORDER — HEPARIN SODIUM 5000 [USP'U]/ML
INJECTION, SOLUTION INTRAVENOUS; SUBCUTANEOUS AS NEEDED
Status: DISCONTINUED | OUTPATIENT
Start: 2017-06-08 | End: 2017-06-08 | Stop reason: HOSPADM

## 2017-06-08 RX ORDER — FENTANYL CITRATE 50 UG/ML
INJECTION, SOLUTION INTRAMUSCULAR; INTRAVENOUS AS NEEDED
Status: DISCONTINUED | OUTPATIENT
Start: 2017-06-08 | End: 2017-06-08 | Stop reason: HOSPADM

## 2017-06-08 RX ORDER — BUPIVACAINE HYDROCHLORIDE 2.5 MG/ML
INJECTION, SOLUTION EPIDURAL; INFILTRATION; INTRACAUDAL AS NEEDED
Status: DISCONTINUED | OUTPATIENT
Start: 2017-06-08 | End: 2017-06-08 | Stop reason: HOSPADM

## 2017-06-08 RX ORDER — PROTAMINE SULFATE 10 MG/ML
INJECTION, SOLUTION INTRAVENOUS AS NEEDED
Status: DISCONTINUED | OUTPATIENT
Start: 2017-06-08 | End: 2017-06-08 | Stop reason: HOSPADM

## 2017-06-08 RX ORDER — MIDAZOLAM HYDROCHLORIDE 1 MG/ML
2 INJECTION, SOLUTION INTRAMUSCULAR; INTRAVENOUS
Status: DISCONTINUED | OUTPATIENT
Start: 2017-06-08 | End: 2017-06-08 | Stop reason: HOSPADM

## 2017-06-08 RX ORDER — LIDOCAINE HYDROCHLORIDE 20 MG/ML
INJECTION, SOLUTION EPIDURAL; INFILTRATION; INTRACAUDAL; PERINEURAL AS NEEDED
Status: DISCONTINUED | OUTPATIENT
Start: 2017-06-08 | End: 2017-06-08 | Stop reason: HOSPADM

## 2017-06-08 RX ORDER — LIDOCAINE HYDROCHLORIDE 10 MG/ML
INJECTION INFILTRATION; PERINEURAL AS NEEDED
Status: DISCONTINUED | OUTPATIENT
Start: 2017-06-08 | End: 2017-06-08 | Stop reason: HOSPADM

## 2017-06-08 RX ORDER — LIDOCAINE HYDROCHLORIDE 10 MG/ML
0.3 INJECTION INFILTRATION; PERINEURAL ONCE
Status: DISCONTINUED | OUTPATIENT
Start: 2017-06-08 | End: 2017-06-08 | Stop reason: HOSPADM

## 2017-06-08 RX ORDER — SODIUM CHLORIDE, SODIUM LACTATE, POTASSIUM CHLORIDE, CALCIUM CHLORIDE 600; 310; 30; 20 MG/100ML; MG/100ML; MG/100ML; MG/100ML
100 INJECTION, SOLUTION INTRAVENOUS CONTINUOUS
Status: DISCONTINUED | OUTPATIENT
Start: 2017-06-08 | End: 2017-06-08 | Stop reason: HOSPADM

## 2017-06-08 RX ORDER — OXYCODONE HYDROCHLORIDE 5 MG/1
10 TABLET ORAL
Status: COMPLETED | OUTPATIENT
Start: 2017-06-08 | End: 2017-06-08

## 2017-06-08 RX ORDER — HEPARIN SODIUM 1000 [USP'U]/ML
INJECTION, SOLUTION INTRAVENOUS; SUBCUTANEOUS AS NEEDED
Status: DISCONTINUED | OUTPATIENT
Start: 2017-06-08 | End: 2017-06-08 | Stop reason: HOSPADM

## 2017-06-08 RX ADMIN — FENTANYL CITRATE 25 MCG: 50 INJECTION, SOLUTION INTRAMUSCULAR; INTRAVENOUS at 07:58

## 2017-06-08 RX ADMIN — DEXAMETHASONE SODIUM PHOSPHATE 4 MG: 4 INJECTION, SOLUTION INTRA-ARTICULAR; INTRALESIONAL; INTRAMUSCULAR; INTRAVENOUS; SOFT TISSUE at 09:32

## 2017-06-08 RX ADMIN — SODIUM CHLORIDE 25 ML/HR: 900 INJECTION, SOLUTION INTRAVENOUS at 07:00

## 2017-06-08 RX ADMIN — LIDOCAINE HYDROCHLORIDE 100 MG: 20 INJECTION, SOLUTION EPIDURAL; INFILTRATION; INTRACAUDAL; PERINEURAL at 07:58

## 2017-06-08 RX ADMIN — ONDANSETRON 4 MG: 2 INJECTION INTRAMUSCULAR; INTRAVENOUS at 09:32

## 2017-06-08 RX ADMIN — PROPOFOL 100 MG: 10 INJECTION, EMULSION INTRAVENOUS at 07:58

## 2017-06-08 RX ADMIN — FENTANYL CITRATE 25 MCG: 50 INJECTION, SOLUTION INTRAMUSCULAR; INTRAVENOUS at 08:20

## 2017-06-08 RX ADMIN — OXYCODONE HYDROCHLORIDE 5 MG: 5 TABLET ORAL at 11:12

## 2017-06-08 RX ADMIN — SODIUM CHLORIDE, SODIUM LACTATE, POTASSIUM CHLORIDE, AND CALCIUM CHLORIDE: 600; 310; 30; 20 INJECTION, SOLUTION INTRAVENOUS at 07:36

## 2017-06-08 RX ADMIN — PROTAMINE SULFATE 10 MG: 10 INJECTION, SOLUTION INTRAVENOUS at 09:18

## 2017-06-08 RX ADMIN — INSULIN LISPRO 6 UNITS: 100 INJECTION, SOLUTION INTRAVENOUS; SUBCUTANEOUS at 07:19

## 2017-06-08 RX ADMIN — PROTAMINE SULFATE 10 MG: 10 INJECTION, SOLUTION INTRAVENOUS at 09:22

## 2017-06-08 RX ADMIN — FAMOTIDINE 20 MG: 10 INJECTION, SOLUTION INTRAVENOUS at 07:22

## 2017-06-08 RX ADMIN — HEPARIN SODIUM 5000 UNITS: 1000 INJECTION, SOLUTION INTRAVENOUS; SUBCUTANEOUS at 08:33

## 2017-06-08 RX ADMIN — GENTAMICIN SULFATE 250 MG: 40 INJECTION, SOLUTION INTRAMUSCULAR; INTRAVENOUS at 07:04

## 2017-06-08 RX ADMIN — PROTAMINE SULFATE 10 MG: 10 INJECTION, SOLUTION INTRAVENOUS at 09:23

## 2017-06-08 NOTE — PROGRESS NOTES
Spiritual Care visit. Initial Visit, Pre Surgery Consult. Visit and prayer before patient goes to surgery.     Visit by Isaac Trent M.Ed., Th.B. ,Staff

## 2017-06-08 NOTE — BRIEF OP NOTE
BRIEF OPERATIVE NOTE    Date of Procedure: 6/8/2017   Preoperative Diagnosis: ESRD (end stage renal disease) ([facility] [N18.6]  Postoperative Diagnosis: ESRD (end stage renal disease) ([facility] [N18.6]    Procedure(s):  ARTERIO VENOUS GRAFT LEFT UPPER ARM  Surgeon(s) and Role:     * Pam Umaña MD - Primary         Assistant Staff:    Surgical Staff:  Circ-1: Miguel Pinon RN  Circ-Relief: Murtaza Bernal RN  Scrub Tech-1: Zina Al  Scrub Tech-2: Paul Vieira  Event Time In   Incision Start 2747   Incision Close      Anesthesia: General   Estimated Blood Loss: 40 mL  Specimens: * No specimens in log *   Findings: Good completion Dopplers at venous outflow. +Doppler at L radial and ulnar arteries at completion. Complications: none  Implants:   Implant Name Type Inv.  Item Serial No.  Lot No. LRB No. Used Action   GRAFT VASC N-S STD WL [id number] -- [employer] - [id number]   GRAFT VASC N-S STD WL [id number] -- [employer] [id number]  [employer]   Left 1 Implanted

## 2017-06-08 NOTE — ANESTHESIA POSTPROCEDURE EVALUATION
Post-Anesthesia Evaluation and Assessment    Patient: Toi Herrera MRN: [id number]  SSN: [id number]    YOB: 1955  Age: 57 y.o. Sex: female       Cardiovascular Function/Vital Signs  Visit Vitals    /55 (BP 1 Location: Right arm, BP Patient Position: At rest)    Pulse 68    Temp 36.8 °C (98.2 °F)    Resp 16    Ht 5' 2\" (1.575 m)    Wt 52.2 kg (115 lb)    SpO2 97%    BMI 21.03 kg/m2       Patient is status post general anesthesia for Procedure(s):  ARTERIO VENOUS GRAFT LEFT UPPER ARM. Nausea/Vomiting: None    Postoperative hydration reviewed and adequate. Pain:  Pain Scale 1: Numeric (0 - 10) (06/08/17 1049)  Pain Intensity 1: 0 (06/08/17 1049)   Managed    Neurological Status:   Neuro (WDL): Within Defined Limits (06/08/17 1049)   At baseline    Mental Status and Level of Consciousness: Awake, drowsy    Pulmonary Status:   O2 Device: Room air (06/08/17 1049)   Adequate oxygenation and airway patent    Complications related to anesthesia: None    Post-anesthesia assessment completed.  No concerns    Signed By: Kelsi Bah MD     June 8, 2017

## 2017-06-08 NOTE — ADDENDUM NOTE
Addendum  created 06/08/17 1507 by Dharmesh Baker CRNA    Anesthesia Intra Meds edited, Orders acknowledged in Narrator

## 2017-06-08 NOTE — DISCHARGE INSTRUCTIONS
Hemodialysis Access: What to Expect at [address] [facility]  Hemodialysis is a way to remove wastes from the blood when your kidneys can no longer do the job. It is not a cure, but it can help you live longer and feel better. It is a lifesaving treatment when you have kidney failure. Hemodialysis is often called dialysis. Your doctor created a place (called an access) in your arm for your blood to flow in and out of your body during your dialysis sessions. Your arm will probably be bruised and swollen. It may hurt. The cut (incision) may bleed. The pain and bleeding will get better over several days. You will probably need only over-the-counter pain medicine. You can reduce swelling by propping your arm on 1 or 2 pillows and keeping your elbow straight. You will have stitches. These may dissolve on their own, or your doctor will tell you when to come in to have them removed. You should also be able to return to work in a few days. You may feel some coolness or numbness in your hand. These feelings usually go away in a few weeks. Your doctor may suggest squeezing a soft object. This will strengthen your access and may make hemodialysis faster and easier. You should always be able to feel blood rushing through the fistula or graft. It feels like a slight vibration when you put your fingers on the skin over the fistula or graft. This feeling is called a thrill or pulse. This care sheet gives you a general idea about how long it will take for you to recover. But each person recovers at a different pace. Follow the steps below to get better as quickly as possible. How can you care for yourself at home? Activity  · Rest when you feel tired. Getting enough sleep will help you recover. Do not lie on or sleep on the arm with the access. · Avoid activities such as washing windows or gardening that put stress on the arm with the access.   · You may use your arm, but do not lift anything that weighs more than about 15 pounds. This may include a child, heavy grocery bags, a heavy briefcase or backpack, cat litter or dog food bags, or a vacuum . · You can shower, but keep the access dry for the first 2 days. Cover the area with a plastic bag to keep it dry. · Do not soak or scrub the incision until it has healed. · Wear an arm guard to protect the area if you play sports or work with your arms. · You may drive when your doctor says it is okay. This is usually in 1 to 2 days. · Most people are able to return to work about 1 or 2 days after surgery. Diet  · Follow an eating plan that is good for your kidneys. A registered dietitian can help you make a meal plan that is right for you. You may need to limit protein, salt, fluids, and certain foods. Medicines  · Your doctor will tell you if and when you can restart your medicines. He or she will also give you instructions about taking any new medicines. · If you take blood thinners, such as warfarin (Coumadin), clopidogrel (Plavix), or aspirin, be sure to talk to your doctor. He or she will tell you if and when to start taking those medicines again. Make sure that you understand exactly what your doctor wants you to do. · Take pain medicines exactly as directed. ¨ If the doctor gave you a prescription medicine for pain, take it as prescribed. ¨ If you are not taking a prescription pain medicine, ask your doctor if you can take acetaminophen (Tylenol). Do not take ibuprofen (Advil, Motrin) or naproxen (Aleve), or similar medicines, unless your doctor tells you to. They may make chronic kidney disease worse. ¨ Do not take two or more pain medicines at the same time unless the doctor told you to. Many pain medicines have acetaminophen, which is Tylenol. Too much acetaminophen (Tylenol) can be harmful. · If you think your pain medicine is making you sick to your stomach:  ¨ Take your medicine after meals (unless your doctor has told you not to).   ¨ Ask your doctor for a different pain medicine. · If your doctor prescribed antibiotics, take them as directed. Do not stop taking them just because you feel better. You need to take the full course of antibiotics. Incision care  · Keep the area dry for 2 days. After 2 days, wash the area with soap and water every day, and always before dialysis. · Do not soak or scrub the incision until it has healed. · If you have a bandage, change it every day or as your doctor recommends. Your doctor will tell you when you can remove it. Exercise  · Squeeze a soft ball or other object as your doctor tells you. This will help blood flow through the access and help prevent blood clots. Elevation  · Prop up the sore arm on a pillow anytime you sit or lie down during the next 3 days. Try to keep it above the level of your heart. This will help reduce swelling. Other instructions  · Every day, check your access for a pulse or thrill in the fistula or graft area. A thrill is a vibration. To feel a pulse or thrill, place the first two fingers of your hand over the access. · If there is severe pain or weakness in the left hand, call Dr. Sarah Bolivar at [phone]. · Do not bump your arm. · Do not wear tight clothing, jewelry, or anything else that may squeeze the access. · Use your other arm to have blood drawn or blood pressure taken. · Do not put cream or lotion on or near the access. · Make sure all doctors you deal with know you have a vascular access. Follow-up care is a key part of your treatment and safety. Be sure to make and go to all appointments, and call your doctor if you are having problems. It's also a good idea to know your test results and keep a list of the medicines you take. When should you call for help? Call [phone] anytime you think you may need emergency care. For example, call if:  · You passed out (lost consciousness). · You have severe trouble breathing.   · You have sudden chest pain and shortness of breath, or you cough up blood.  · You have a rapid pulse or heartbeat. Call your doctor now or seek immediate medical care if:  · Your hand or arm is cold or dark-colored. · You have sudden bulging around your access. · You have no pulse or thrill in your access, or you hear no sound of blood in your access. · Bleeding after dialysis lasts longer than usual.  · You have severe pain that does not get better after you take pain medicine. · You have a fever over 100°F.  · You have loose stitches, or your incision comes open. · You are bleeding from the incision more than you had been. · You have signs of infection, such as:  ¨ Increased pain, swelling, warmth, or redness. ¨ Red streaks leading from the incision. ¨ Pus draining from the incision. ¨ Swollen lymph nodes in your neck, armpits, or groin. ¨ A fever. Watch closely for changes in your health, and be sure to contact your doctor if you have any problems. Where can you learn more? Go to http://dayna-ijeoma.info/. Enter P616 in the search box to learn more about \"Hemodialysis Access: What to Expect at Home. \"  Current as of: November 16, 2016  Content Version: 11.2  © 1602-0202 [employer]. Care instructions adapted under license by [employer] (which disclaims liability or warranty for this information). If you have questions about a medical condition or this instruction, always ask your healthcare professional. [place] [address] any warranty or liability for your use of this information. After general anesthesia or intravenous sedation, for 24 hours or while taking prescription Narcotics:  · Limit your activities  · Do not drive and operate hazardous machinery  · Do not make important personal or business decisions  · Do  not drink alcoholic beverages  · If you have not urinated within 8 hours after discharge, please contact your surgeon on call.     *  Please give a list of your current medications to your Primary Care Provider. *  Please update this list whenever your medications are discontinued, doses are      changed, or new medications (including over-the-counter products) are added. *  Please carry medication information at all times in case of emergency situations. These are general instructions for a healthy lifestyle:  No smoking/ No tobacco products/ Avoid exposure to second hand smoke  Surgeon General's Warning:  Quitting smoking now greatly reduces serious risk to your health. Obesity, smoking, and sedentary lifestyle greatly increases your risk for illness  A healthy diet, regular physical exercise & weight monitoring are important for maintaining a healthy lifestyle    You may be retaining fluid if you have a history of heart failure or if you experience any of the following symptoms:  Weight gain of 3 pounds or more overnight or 5 pounds in a week, increased swelling in our hands or feet or shortness of breath while lying flat in bed. Please call your doctor as soon as you notice any of these symptoms; do not wait until your next office visit. Recognize signs and symptoms of STROKE:    F-face looks uneven    A-arms unable to move or move unevenly    S-speech slurred or non-existent    T-time-call 911 as soon as signs and symptoms begin-DO NOT go       Back to bed or wait to see if you get better-TIME IS BRAIN.

## 2017-06-08 NOTE — IP AVS SNAPSHOT
[address] [address] 
[phone] Patient: Pelon Beck MRN: [id number] LKK:[id number] You are allergic to the following Allergen Reactions Pcn (Penicillins) Swelling Recent Documentation Height Weight BMI OB Status Smoking Status 1.575 m 52.2 kg 21.03 kg/m2 Hysterectomy Never Smoker Emergency Contacts Name Discharge Info Relation Home Work Mobile Emmett  Child [2] Aj Catena  Sister [23] [phone] Tenisha Strickland  Sister [23] About your hospitalization You were admitted on:  June 8, 2017 You last received care in the:  [facility] PACU You were discharged on:  June 8, 2017 Unit phone number:  [phone] Why you were hospitalized Your primary diagnosis was:  Not on File Providers Seen During Your Hospitalizations Provider Role Specialty Primary office phone Roseanna Emanuel MD Attending Provider Vascular Surgery [phone] Your Primary Care Physician (PCP) Primary Care Physician Office Phone Office Fax Gregory Smiley [phone] Follow-up Information Follow up With Details Comments Contact Info Jonatan Almazan MD   [address] [facility] [address] 
[phone] Roseanna Emanuel MD   [address] [facility] [address] [facility] [address] 
[phone] Your Appointments Tuesday June 13, 2017 10:50 AM EDT SHORT with Andrea Chaudhari[address]  
[phone] Tuesday June 20, 2017  2:30 PM EDT Global Post Op with Roseanna Emanuel MD  
[facility] [address] [place] [id number] [phone] Current Discharge Medication List  
  
 CONTINUE these medications which have NOT CHANGED Dose & Instructions Dispensing Information Comments Morning Noon Evening Bedtime  
 albuterol 90 mcg/actuation inhaler Commonly known as:  PROAIR HFA Your last dose was: Your next dose is:    
   
   
 Dose:  2 Puff Take 2 Puffs by inhalation every four (4) hours as needed for Wheezing. Quantity:  1 Inhaler Refills:  2 ALPRAZolam 1 mg tablet Commonly known as:  Temo Amadorke Your last dose was: Your next dose is:    
   
   
 Dose:  1 Tab Take 1 Tab by mouth nightly. Refills:  0  
     
   
   
   
  
 amLODIPine 10 mg tablet Commonly known as:  Barabara Puls Your last dose was: Your next dose is:    
   
   
 Dose:  10 mg Take 10 mg by mouth every morning. Refills:  0  
     
   
   
   
  
 aspirin delayed-release 81 mg tablet Your last dose was: Your next dose is:    
   
   
 Dose:  81 mg Take 81 mg by mouth every morning. Instructed to take DOS per anesthesia protocol with a sip of water. Refills:  0  
     
   
   
   
  
 carvedilol 6.25 mg tablet Commonly known as:  Render Blonder Your last dose was: Your next dose is:    
   
   
 Dose:  6.25 mg Take 6.25 mg by mouth two (2) times daily (with meals). Refills:  0  
     
   
   
   
  
 cinacalcet 30 mg tablet Commonly known as:  SENSIPAR Your last dose was: Your next dose is:    
   
   
 Dose:  30 mg Take 30 mg by mouth three (3) times daily. Refills:  0  
     
   
   
   
  
 clindamycin 1 % topical gel Commonly known as:  CLINDAGEL Your last dose was: Your next dose is:    
   
   
 Apply  to affected area daily. Apply to lesion on hand every day. Refills:  0  
     
   
   
   
  
 COMBIVENT  mcg/actuation inhaler Generic drug:  ipratropium-albuterol Your last dose was: Your next dose is: Take  by inhalation as needed. Refills:  0  
     
   
   
   
  
 furosemide 80 mg tablet Commonly known as:  LASIX Your last dose was: Your next dose is:    
   
   
 Dose:  80 mg Take 80 mg by mouth two (2) times a day. Refills:  0  
     
   
   
   
  
 gabapentin 100 mg capsule Commonly known as:  NEURONTIN Your last dose was: Your next dose is:    
   
   
 Dose:  100 mg Take 100 mg by mouth every morning. Refills:  0  
     
   
   
   
  
 hydrOXYzine HCl 25 mg tablet Commonly known as:  ATARAX Your last dose was: Your next dose is:    
   
   
 Dose:  1 Tab Take 1 Tab by mouth every six (6) hours as needed. Refills:  0 IMDUR 60 mg CR tablet Generic drug:  isosorbide mononitrate ER Your last dose was: Your next dose is:    
   
   
 Dose:  60 mg Take 60 mg by mouth every morning. Instructed to take DOS per anesthesia protocol with a sip of water. Refills:  0  
     
   
   
   
  
 lisinopril 10 mg tablet Commonly known as:  Marquez Daft Your last dose was: Your next dose is:    
   
   
 Dose:  10 mg Take 10 mg by mouth nightly. Refills:  0  
     
   
   
   
  
 nitroglycerin 0.4 mg/hr Commonly known as:  TOFGFSOI Your last dose was: Your next dose is:    
   
   
 Dose:  1 Patch 1 Patch by TransDERmal route daily as needed. Refills:  0 NovoLIN 70/30 100 unit/mL (70-30) injection Generic drug:  insulin NPH/insulin regular Your last dose was: Your next dose is:    
   
   
 Dose:  25 Units 25 Units by SubCUTAneous route two (2) times a day. Refills:  0  
     
   
   
   
  
 pantoprazole 40 mg tablet Commonly known as:  PROTONIX Your last dose was: Your next dose is:    
   
   
 Dose:  40 mg Take 40 mg by mouth every morning. Refills:  0  POTASSIUM CHLORIDE  
 Your last dose was: Your next dose is:    
   
   
 nightly. Potassium Chloride CR (10MEQ Capsule ER, 1 Oral every day) Refills:  0  
     
   
   
   
  
 pravastatin 80 mg tablet Commonly known as:  PRAVACHOL Your last dose was: Your next dose is:    
   
   
 Dose:  80 mg Take 80 mg by mouth nightly. Refills:  0  
     
   
   
   
  
 temazepam 15 mg capsule Commonly known as:  RESTORIL Your last dose was: Your next dose is:    
   
   
 Dose:  1 Cap Take 1 Cap by mouth nightly. Refills:  0 VOL-CARE RX PO Your last dose was: Your next dose is:    
   
   
 Dose:  1 mg  
1 mg every morning. Tablet, Oral  
 Refills:  0 Discharge Instructions Hemodialysis Access: What to Expect at [facility] Your Recovery Hemodialysis is a way to remove wastes from the blood when your kidneys can no longer do the job. It is not a cure, but it can help you live longer and feel better. It is a lifesaving treatment when you have kidney failure. Hemodialysis is often called dialysis. Your doctor created a place (called an access) in your arm for your blood to flow in and out of your body during your dialysis sessions. Your arm will probably be bruised and swollen. It may hurt. The cut (incision) may bleed. The pain and bleeding will get better over several days. You will probably need only over-the-counter pain medicine. You can reduce swelling by propping your arm on 1 or 2 pillows and keeping your elbow straight. You will have stitches. These may dissolve on their own, or your doctor will tell you when to come in to have them removed. You should also be able to return to work in a few days. You may feel some coolness or numbness in your hand. These feelings usually go away in a few weeks.  Your doctor may suggest squeezing a soft object. This will strengthen your access and may make hemodialysis faster and easier. You should always be able to feel blood rushing through the fistula or graft. It feels like a slight vibration when you put your fingers on the skin over the fistula or graft. This feeling is called a thrill or pulse. This care sheet gives you a general idea about how long it will take for you to recover. But each person recovers at a different pace. Follow the steps below to get better as quickly as possible. How can you care for yourself at home? Activity · Rest when you feel tired. Getting enough sleep will help you recover. Do not lie on or sleep on the arm with the access. · Avoid activities such as washing windows or gardening that put stress on the arm with the access. · You may use your arm, but do not lift anything that weighs more than about 15 pounds. This may include a child, heavy grocery bags, a heavy briefcase or backpack, cat litter or dog food bags, or a vacuum . · You can shower, but keep the access dry for the first 2 days. Cover the area with a plastic bag to keep it dry. · Do not soak or scrub the incision until it has healed. · Wear an arm guard to protect the area if you play sports or work with your arms. · You may drive when your doctor says it is okay. This is usually in 1 to 2 days. · Most people are able to return to work about 1 or 2 days after surgery. Diet · Follow an eating plan that is good for your kidneys. A registered dietitian can help you make a meal plan that is right for you. You may need to limit protein, salt, fluids, and certain foods. Medicines · Your doctor will tell you if and when you can restart your medicines. He or she will also give you instructions about taking any new medicines. · If you take blood thinners, such as warfarin (Coumadin), clopidogrel (Plavix), or aspirin, be sure to talk to your doctor.  He or she will tell you if and when to start taking those medicines again. Make sure that you understand exactly what your doctor wants you to do. · Take pain medicines exactly as directed. ¨ If the doctor gave you a prescription medicine for pain, take it as prescribed. ¨ If you are not taking a prescription pain medicine, ask your doctor if you can take acetaminophen (Tylenol). Do not take ibuprofen (Advil, Motrin) or naproxen (Aleve), or similar medicines, unless your doctor tells you to. They may make chronic kidney disease worse. ¨ Do not take two or more pain medicines at the same time unless the doctor told you to. Many pain medicines have acetaminophen, which is Tylenol. Too much acetaminophen (Tylenol) can be harmful. · If you think your pain medicine is making you sick to your stomach: 
¨ Take your medicine after meals (unless your doctor has told you not to). ¨ Ask your doctor for a different pain medicine. · If your doctor prescribed antibiotics, take them as directed. Do not stop taking them just because you feel better. You need to take the full course of antibiotics. Incision care · Keep the area dry for 2 days. After 2 days, wash the area with soap and water every day, and always before dialysis. · Do not soak or scrub the incision until it has healed. · If you have a bandage, change it every day or as your doctor recommends. Your doctor will tell you when you can remove it. Exercise · Squeeze a soft ball or other object as your doctor tells you. This will help blood flow through the access and help prevent blood clots. Elevation · Prop up the sore arm on a pillow anytime you sit or lie down during the next 3 days. Try to keep it above the level of your heart. This will help reduce swelling. Other instructions · Every day, check your access for a pulse or thrill in the fistula or graft area. A thrill is a vibration. To feel a pulse or thrill, place the first two fingers of your hand over the access. · If there is severe pain or weakness in the left hand, call Dr. Yusra Barber at [phone]. · Do not bump your arm. · Do not wear tight clothing, jewelry, or anything else that may squeeze the access. · Use your other arm to have blood drawn or blood pressure taken. · Do not put cream or lotion on or near the access. · Make sure all doctors you deal with know you have a vascular access. Follow-up care is a key part of your treatment and safety. Be sure to make and go to all appointments, and call your doctor if you are having problems. It's also a good idea to know your test results and keep a list of the medicines you take. When should you call for help? Call 911 anytime you think you may need emergency care. For example, call if: 
· You passed out (lost consciousness). · You have severe trouble breathing. · You have sudden chest pain and shortness of breath, or you cough up blood. · You have a rapid pulse or heartbeat. Call your doctor now or seek immediate medical care if: 
· Your hand or arm is cold or dark-colored. · You have sudden bulging around your access. · You have no pulse or thrill in your access, or you hear no sound of blood in your access. · Bleeding after dialysis lasts longer than usual. 
· You have severe pain that does not get better after you take pain medicine. · You have a fever over 100°F. 
· You have loose stitches, or your incision comes open. · You are bleeding from the incision more than you had been. · You have signs of infection, such as: 
¨ Increased pain, swelling, warmth, or redness. ¨ Red streaks leading from the incision. ¨ Pus draining from the incision. ¨ Swollen lymph nodes in your neck, armpits, or groin. ¨ A fever. Watch closely for changes in your health, and be sure to contact your doctor if you have any problems. Where can you learn more? Go to http://charles.info/. Enter P616 in the search box to learn more about \"Hemodialysis Access: What to Expect at Home. \" Current as of: November 16, 2016 Content Version: 11.2 © 8964-0290 [employer]. Care instructions adapted under license by [employer] (which disclaims liability or warranty for this information). If you have questions about a medical condition or this instruction, always ask your healthcare professional. [address] any warranty or liability for your use of this information. After general anesthesia or intravenous sedation, for 24 hours or while taking prescription Narcotics: · Limit your activities · Do not drive and operate hazardous machinery · Do not make important personal or business decisions · Do  not drink alcoholic beverages · If you have not urinated within 8 hours after discharge, please contact your surgeon on call. *  Please give a list of your current medications to your Primary Care Provider. *  Please update this list whenever your medications are discontinued, doses are 
    changed, or new medications (including over-the-counter products) are added. *  Please carry medication information at all times in case of emergency situations. These are general instructions for a healthy lifestyle: No smoking/ No tobacco products/ Avoid exposure to second hand smoke Surgeon General's Warning:  Quitting smoking now greatly reduces serious risk to your health. Obesity, smoking, and sedentary lifestyle greatly increases your risk for illness A healthy diet, regular physical exercise & weight monitoring are important for maintaining a healthy lifestyle You may be retaining fluid if you have a history of heart failure or if you experience any of the following symptoms:  Weight gain of 3 pounds or more overnight or 5 pounds in a week, increased swelling in our hands or feet or shortness of breath while lying flat in bed.   Please call your doctor as soon as you notice any of these symptoms; do not wait until your next office visit. Recognize signs and symptoms of STROKE: 
 
F-face looks uneven A-arms unable to move or move unevenly S-speech slurred or non-existent T-time-call 911 as soon as signs and symptoms begin-DO NOT go Back to bed or wait to see if you get better-TIME IS BRAIN. Discharge Orders None Introducing [facility]! [facility] introduces Drifty patient portal. Now you can access parts of your medical record, email your doctor's office, and request medication refills online. 1. In your internet browser, go to [email or web address] 2. Click on the First Time User? Click Here link in the Sign In box. You will see the New Member Sign Up page. 3. Enter your Drifty Access Code exactly as it appears below. You will not need to use this code after youve completed the sign-up process. If you do not sign up before the expiration date, you must request a new code. · Drifty Access Code: [email or web address] Expires: 8/29/2017 12:36 PM 
 
4. Enter the last four digits of your Social Security Number (xxxx) and Date of Birth (mm/dd/yyyy) as indicated and click Submit. You will be taken to the next sign-up page. 5. Create a Drifty ID. This will be your Drifty login ID and cannot be changed, so think of one that is secure and easy to remember. 6. Create a Drifty password. You can change your password at any time. 7. Enter your Password Reset Question and Answer. This can be used at a later time if you forget your password. 8. Enter your e-mail address. You will receive e-mail notification when new information is available in [address] 9. Click Sign Up. You can now view and download portions of your medical record. 10. Click the Download Summary menu link to download a portable copy of your medical information. If you have questions, please visit the Frequently Asked Questions section of the MyChart website. Remember, MyChart is NOT to be used for urgent needs. For medical emergencies, dial 911. Now available from your iPhone and Android! General Information Please provide this summary of care documentation to your next provider. Patient Signature:  ____________________________________________________________ Date:  ____________________________________________________________  
  
Lissette Cordon Provider Signature:  ____________________________________________________________ Date:  ____________________________________________________________

## 2017-06-08 NOTE — ANESTHESIA PREPROCEDURE EVALUATION
Anesthetic History   No history of anesthetic complications            Review of Systems / Medical History  Patient summary reviewed and pertinent labs reviewed    Pulmonary            Asthma        Neuro/Psych              Cardiovascular        Angina      CAD, cardiac stents and CABG    Exercise tolerance: <4 METS  Comments: Chronic sometimes severe angina, cath Feb 2015 with preserved EF and stable CAD   GI/Hepatic/Renal         Renal disease: ESRD and dialysis       Endo/Other    Diabetes: poorly controlled, type 2, using insulin  Hypothyroidism  Arthritis and anemia     Other Findings   Comments: Noncompliant with medicines and therapy per cardiology notes           Physical Exam    Airway  Mallampati: II  TM Distance: 4 - 6 cm  Neck ROM: decreased range of motion, short neck   Mouth opening: Normal     Cardiovascular    Rhythm: regular  Rate: normal         Dental  No notable dental hx       Pulmonary  Breath sounds clear to auscultation               Abdominal         Other Findings            Anesthetic Plan    ASA: 4  Anesthesia type: general          Induction: Intravenous  Anesthetic plan and risks discussed with: Patient and Family

## 2017-06-08 NOTE — OP NOTES
[facility]   OPERATIVE REPORT       Name:  Juan Blake   MR#:  [id number]   :  1955   Account #:  [de-identified]   Date of Adm:  2017       DATE OF SURGERY: 2017    PREOPERATIVE DIAGNOSIS: End-stage renal disease. POSTOPERATIVE DIAGNOSIS: End-stage renal disease. PROCEDURE: Insertion of left upper arm arteriovenous graft. SURGEON: Ifeoma Amador MD.    ANESTHESIA: General with local supplement. ESTIMATED BLOOD LOSS: 40 mL. SPECIMEN: None. STATEMENT OF MEDICAL NECESSITY: The patient is a 61-year-old   woman with end-stage renal disease who has failed a left forearm   graft. The venous outflow in the upper arm appears to be   suitable on ultrasound for a new upper arm graft. PROCEDURE: The patient was taken to the operating room and   general anesthetic was administered. The left upper extremity   was prepped and draped in the usual sterile fashion. Chevis Able   covers were used. The skin was anesthetized in the upper arm   near the axilla and a standard longitudinal incision was made to   expose the junction of the basilic and brachial veins. The   basilic appeared to the larger of the 2 and this was dissected   out and encircled with vessel loops. The vein was compressible   with no thrombotic or phlebitic changes and had a diameter of   approximately 7 mm. An incision was made on the distal medial upper arm just above   the antecubital fossa after first anesthetizing the skin so that   the brachial artery could be dissected out. There were some   inflammatory changes around the artery, likely from previous   venous intervention of the previously failed forearm graft, but   the brachial artery was nonetheless dissected out successfully   and found to have a soft arterial wall with minimal   calcifications. The vessel was surrounded with vessel loops   proximally and distally.     A Mary-Wick tunneler was then used to make a curvilinear   subcutaneous tunnel between the 2 incisions that was tunneled   anterolaterally over the upper arm. A 6 mm standard wall PTFE   graft was secured to the tunneler and brought gently through the   tunnel. Care was taken to ensure that there was no twisting or   kinking of the graft. Heparin 5000 units was then given   intravenously. 2 minutes later, the brachial artery was occluded   proximally and distally with De vascular clamps. A   longitudinal arteriotomy was made and the lumen of the artery   was inspected and found to be normal with minimal   atherosclerotic changes. The graft was divided, spatulated and   then anastomosed in an end-to-side fashion to the brachial   artery using a single continuous 5-0 Prolene suture. The   anastomosis was flushed and back bled, then flow was restored   briefly through the venous limb where there was a strong flow. The graft was clamped and then the vessels were unclamped to   restore blood flow to the left hand. Attention was then directed to the venous anastomosis near the   axilla. The basilic vein was clamped proximally and distally   with De vascular clamps and a longitudinal venotomy was made. The lumen of the vein was inspected and found to be normal.   There were no valve segments. The graft was divided, spatulated   and then anastomosed in an end-to-side fashion to the upper arm   basilic vein using a continuous 5-0 Prolene suture. The   anastomosis was flushed and back bled, then flow was restored   into the upper arm basilic vein. There was a strong thrill and a   strong Doppler signal there as well. Doppler signals in the   brachial artery above and below the anastomosis were normal and   arterial Doppler signals were detected at the radial and ulnar   artery, although these did augment somewhat with compression of   the graft. Nonetheless, they appeared to be adequate with the   graft flowing. Protamine was then given to reverse the remaining heparin.  The incisions were thoroughly irrigated. Hemostasis was obtained. The incisions were then closed in layers with running and/or   interrupted 3-0 Vicryl sutures in the subcutaneous fascial   layers. Care was taken not to compress or entrap the graft. The   skin incisions were then closed with running 4-0 subcuticular   Monocryl. Dermabond was applied to the skin incisions. The patient tolerated the procedure well and went to recovery in   stable condition.         MD Vidya Hughes / Watt Bloch   D:  06/08/2017   09:44   T:  06/08/2017   10:43   Job #:  [id number]

## 2017-06-15 ENCOUNTER — HOSPICE ADMISSION (OUTPATIENT)
Dept: HOSPICE | Facility: HOSPICE | Age: 62
End: 2017-06-15

## (undated) DEVICE — DISH PTRI STRL --

## (undated) DEVICE — SUTURE PROL SZ 5-0 L24IN NONABSORBABLE BLU L9.3MM BV-1 3/8 9702H

## (undated) DEVICE — VASCUCLAMP RADIOPAQUE VASCULAR CLAMP SMALL STRAIGHT: Brand: VASCUCLAMP

## (undated) DEVICE — Device

## (undated) DEVICE — SUTURE VCRL SZ 3-0 L27IN ABSRB UD L26MM SH 1/2 CIR J416H

## (undated) DEVICE — INTENDED TO BE USED TO OCCLUDE, RETRACT AND IDENTIFY ARTERIES, VEINS, TENDONS AND NERVES IN SURGICAL PROCEDURES: Brand: STERION®  VESSEL LOOP

## (undated) DEVICE — SUTURE PERMAHAND SZ 3-0 L18IN NONABSORBABLE BLK SILK BRAID A184H

## (undated) DEVICE — BUTTON SWITCH PENCIL BLADE ELECTRODE, HOLSTER: Brand: EDGE

## (undated) DEVICE — SUTURE PERMAHAND SZ 2-0 L12X18IN NONABSORBABLE BLK SILK A185H

## (undated) DEVICE — SUTURE PROL SZ 6-0 L24IN NONABSORBABLE BLU BV-1 L9.3MM 3/8 M8805

## (undated) DEVICE — (D)PREP SKN CHLRAPRP APPL 26ML -- CONVERT TO ITEM 371833

## (undated) DEVICE — 2000CC GUARDIAN II: Brand: GUARDIAN

## (undated) DEVICE — REM POLYHESIVE ADULT PATIENT RETURN ELECTRODE: Brand: VALLEYLAB

## (undated) DEVICE — UNIVERSAL DRAPES: Brand: MEDLINE INDUSTRIES, INC.

## (undated) DEVICE — GEL US 20GM NONIRRITATING OVERWRAPPED FILE PCH TRNSMIT

## (undated) DEVICE — APPLIER CLP SM BLK TI AUTO HNDL DISP W/ 20 CLP PREM SURGICLP

## (undated) DEVICE — DERMABOND SKIN ADH 0.7ML -- DERMABOND ADVANCED 12/BX

## (undated) DEVICE — DRAPE SHT 3 QTR PROXIMA 53X77 --

## (undated) DEVICE — DRAPE TWL SURG 16X26IN BLU ORB04] ALLCARE INC]

## (undated) DEVICE — LIGHT GLOVE: Brand: DEVON